# Patient Record
Sex: MALE | Race: WHITE | Employment: OTHER | URBAN - METROPOLITAN AREA
[De-identification: names, ages, dates, MRNs, and addresses within clinical notes are randomized per-mention and may not be internally consistent; named-entity substitution may affect disease eponyms.]

---

## 2018-04-10 ENCOUNTER — HOSPITAL ENCOUNTER (EMERGENCY)
Age: 58
Discharge: HOME | End: 2018-04-10
Payer: MEDICAID

## 2018-04-10 ENCOUNTER — APPOINTMENT (OUTPATIENT)
Dept: GENERAL RADIOLOGY | Age: 58
End: 2018-04-10
Payer: MEDICAID

## 2018-04-10 VITALS
OXYGEN SATURATION: 98 % | RESPIRATION RATE: 16 BRPM | SYSTOLIC BLOOD PRESSURE: 154 MMHG | BODY MASS INDEX: 32.47 KG/M2 | TEMPERATURE: 97.9 F | HEIGHT: 66 IN | HEART RATE: 60 BPM | DIASTOLIC BLOOD PRESSURE: 86 MMHG | WEIGHT: 202 LBS

## 2018-04-10 DIAGNOSIS — H81.10 BENIGN PAROXYSMAL POSITIONAL VERTIGO, UNSPECIFIED LATERALITY: Primary | ICD-10-CM

## 2018-04-10 DIAGNOSIS — R11.2 NON-INTRACTABLE VOMITING WITH NAUSEA, UNSPECIFIED VOMITING TYPE: ICD-10-CM

## 2018-04-10 DIAGNOSIS — H61.21 EXCESSIVE CERUMEN IN EAR CANAL, RIGHT: ICD-10-CM

## 2018-04-10 DIAGNOSIS — R03.0 ELEVATED BLOOD PRESSURE READING: ICD-10-CM

## 2018-04-10 LAB
ALBUMIN SERPL-MCNC: 4.1 G/DL (ref 3.5–5.2)
ALP BLD-CCNC: 75 U/L (ref 40–129)
ALT SERPL-CCNC: 40 U/L (ref 0–40)
ANION GAP SERPL CALCULATED.3IONS-SCNC: 14 MMOL/L (ref 7–16)
AST SERPL-CCNC: 30 U/L (ref 0–39)
BACTERIA: ABNORMAL /HPF
BASOPHILS ABSOLUTE: 0.07 E9/L (ref 0–0.2)
BASOPHILS RELATIVE PERCENT: 0.6 % (ref 0–2)
BILIRUB SERPL-MCNC: 0.8 MG/DL (ref 0–1.2)
BILIRUBIN URINE: NEGATIVE
BLOOD, URINE: ABNORMAL
BUN BLDV-MCNC: 12 MG/DL (ref 6–20)
CALCIUM SERPL-MCNC: 8.7 MG/DL (ref 8.6–10.2)
CHLORIDE BLD-SCNC: 102 MMOL/L (ref 98–107)
CLARITY: CLEAR
CO2: 23 MMOL/L (ref 22–29)
COLOR: YELLOW
CREAT SERPL-MCNC: 0.9 MG/DL (ref 0.7–1.2)
EKG ATRIAL RATE: 69 BPM
EKG P AXIS: 57 DEGREES
EKG P-R INTERVAL: 156 MS
EKG Q-T INTERVAL: 422 MS
EKG QRS DURATION: 98 MS
EKG QTC CALCULATION (BAZETT): 452 MS
EKG R AXIS: -16 DEGREES
EKG T AXIS: 6 DEGREES
EKG VENTRICULAR RATE: 69 BPM
EOSINOPHILS ABSOLUTE: 0.15 E9/L (ref 0.05–0.5)
EOSINOPHILS RELATIVE PERCENT: 1.4 % (ref 0–6)
GFR AFRICAN AMERICAN: >60
GFR NON-AFRICAN AMERICAN: >60 ML/MIN/1.73
GLUCOSE BLD-MCNC: 146 MG/DL (ref 74–109)
GLUCOSE URINE: NEGATIVE MG/DL
HCT VFR BLD CALC: 45.9 % (ref 37–54)
HEMOGLOBIN: 15.5 G/DL (ref 12.5–16.5)
IMMATURE GRANULOCYTES #: 0.04 E9/L
IMMATURE GRANULOCYTES %: 0.4 % (ref 0–5)
KETONES, URINE: NEGATIVE MG/DL
LEUKOCYTE ESTERASE, URINE: NEGATIVE
LYMPHOCYTES ABSOLUTE: 2.41 E9/L (ref 1.5–4)
LYMPHOCYTES RELATIVE PERCENT: 21.9 % (ref 20–42)
MCH RBC QN AUTO: 32.1 PG (ref 26–35)
MCHC RBC AUTO-ENTMCNC: 33.8 % (ref 32–34.5)
MCV RBC AUTO: 95 FL (ref 80–99.9)
MONOCYTES ABSOLUTE: 0.92 E9/L (ref 0.1–0.95)
MONOCYTES RELATIVE PERCENT: 8.3 % (ref 2–12)
NEUTROPHILS ABSOLUTE: 7.43 E9/L (ref 1.8–7.3)
NEUTROPHILS RELATIVE PERCENT: 67.4 % (ref 43–80)
NITRITE, URINE: NEGATIVE
PDW BLD-RTO: 13.2 FL (ref 11.5–15)
PH UA: 6 (ref 5–9)
PLATELET # BLD: 324 E9/L (ref 130–450)
PMV BLD AUTO: 9.7 FL (ref 7–12)
POTASSIUM SERPL-SCNC: 3.7 MMOL/L (ref 3.5–5)
PROTEIN UA: NEGATIVE MG/DL
RBC # BLD: 4.83 E12/L (ref 3.8–5.8)
RBC UA: ABNORMAL /HPF (ref 0–2)
SODIUM BLD-SCNC: 139 MMOL/L (ref 132–146)
SPECIFIC GRAVITY UA: 1.02 (ref 1–1.03)
TOTAL PROTEIN: 7.7 G/DL (ref 6.4–8.3)
TROPONIN: <0.01 NG/ML (ref 0–0.03)
UROBILINOGEN, URINE: 0.2 E.U./DL
WBC # BLD: 11 E9/L (ref 4.5–11.5)
WBC UA: ABNORMAL /HPF (ref 0–5)

## 2018-04-10 PROCEDURE — 80053 COMPREHEN METABOLIC PANEL: CPT

## 2018-04-10 PROCEDURE — 71046 X-RAY EXAM CHEST 2 VIEWS: CPT

## 2018-04-10 PROCEDURE — 85025 COMPLETE CBC W/AUTO DIFF WBC: CPT

## 2018-04-10 PROCEDURE — 81001 URINALYSIS AUTO W/SCOPE: CPT

## 2018-04-10 PROCEDURE — 99284 EMERGENCY DEPT VISIT MOD MDM: CPT

## 2018-04-10 PROCEDURE — 36415 COLL VENOUS BLD VENIPUNCTURE: CPT

## 2018-04-10 PROCEDURE — 84484 ASSAY OF TROPONIN QUANT: CPT

## 2018-04-10 PROCEDURE — 93005 ELECTROCARDIOGRAM TRACING: CPT | Performed by: PHYSICIAN ASSISTANT

## 2018-04-10 RX ORDER — MECLIZINE HYDROCHLORIDE 25 MG/1
25 TABLET ORAL 3 TIMES DAILY PRN
Qty: 30 TABLET | Refills: 0 | Status: SHIPPED | OUTPATIENT
Start: 2018-04-10 | End: 2018-04-20

## 2018-04-10 RX ORDER — ONDANSETRON 4 MG/1
4 TABLET, ORALLY DISINTEGRATING ORAL EVERY 8 HOURS PRN
Qty: 24 TABLET | Refills: 0 | Status: SHIPPED | OUTPATIENT
Start: 2018-04-10 | End: 2018-05-18

## 2018-04-30 ENCOUNTER — TELEPHONE (OUTPATIENT)
Dept: FAMILY MEDICINE CLINIC | Age: 58
End: 2018-04-30

## 2018-05-18 ENCOUNTER — TELEPHONE (OUTPATIENT)
Dept: FAMILY MEDICINE CLINIC | Age: 58
End: 2018-05-18

## 2018-05-18 ENCOUNTER — HOSPITAL ENCOUNTER (EMERGENCY)
Age: 58
Discharge: HOME | End: 2018-05-18
Payer: MEDICAID

## 2018-05-18 ENCOUNTER — APPOINTMENT (OUTPATIENT)
Dept: CT IMAGING | Age: 58
End: 2018-05-18
Payer: MEDICAID

## 2018-05-18 VITALS
SYSTOLIC BLOOD PRESSURE: 148 MMHG | WEIGHT: 210 LBS | DIASTOLIC BLOOD PRESSURE: 88 MMHG | HEART RATE: 67 BPM | BODY MASS INDEX: 33.75 KG/M2 | OXYGEN SATURATION: 98 % | RESPIRATION RATE: 16 BRPM | TEMPERATURE: 97.9 F | HEIGHT: 66 IN

## 2018-05-18 DIAGNOSIS — R10.12 LEFT UPPER QUADRANT PAIN: Primary | ICD-10-CM

## 2018-05-18 DIAGNOSIS — N20.0 KIDNEY STONE: ICD-10-CM

## 2018-05-18 LAB
ALBUMIN SERPL-MCNC: 4.2 G/DL (ref 3.5–5.2)
ALP BLD-CCNC: 69 U/L (ref 40–129)
ALT SERPL-CCNC: 36 U/L (ref 0–40)
AMYLASE: 56 U/L (ref 20–100)
ANION GAP SERPL CALCULATED.3IONS-SCNC: 12 MMOL/L (ref 7–16)
AST SERPL-CCNC: 28 U/L (ref 0–39)
BACTERIA: ABNORMAL /HPF
BASOPHILS ABSOLUTE: 0.05 E9/L (ref 0–0.2)
BASOPHILS RELATIVE PERCENT: 0.5 % (ref 0–2)
BILIRUB SERPL-MCNC: 1 MG/DL (ref 0–1.2)
BILIRUBIN URINE: NEGATIVE
BLOOD, URINE: ABNORMAL
BUN BLDV-MCNC: 13 MG/DL (ref 6–20)
CALCIUM SERPL-MCNC: 8.9 MG/DL (ref 8.6–10.2)
CHLORIDE BLD-SCNC: 102 MMOL/L (ref 98–107)
CLARITY: CLEAR
CO2: 25 MMOL/L (ref 22–29)
COLOR: YELLOW
CREAT SERPL-MCNC: 0.9 MG/DL (ref 0.7–1.2)
CRYSTALS, UA: ABNORMAL
EKG ATRIAL RATE: 62 BPM
EKG P AXIS: 33 DEGREES
EKG P-R INTERVAL: 174 MS
EKG Q-T INTERVAL: 434 MS
EKG QRS DURATION: 106 MS
EKG QTC CALCULATION (BAZETT): 440 MS
EKG R AXIS: -43 DEGREES
EKG T AXIS: 6 DEGREES
EKG VENTRICULAR RATE: 62 BPM
EOSINOPHILS ABSOLUTE: 0.16 E9/L (ref 0.05–0.5)
EOSINOPHILS RELATIVE PERCENT: 1.5 % (ref 0–6)
EPITHELIAL CELLS, UA: ABNORMAL /HPF
GFR AFRICAN AMERICAN: >60
GFR NON-AFRICAN AMERICAN: >60 ML/MIN/1.73
GLUCOSE BLD-MCNC: 93 MG/DL (ref 74–109)
GLUCOSE URINE: NEGATIVE MG/DL
HCT VFR BLD CALC: 41.6 % (ref 37–54)
HEMOGLOBIN: 14.2 G/DL (ref 12.5–16.5)
IMMATURE GRANULOCYTES #: 0.03 E9/L
IMMATURE GRANULOCYTES %: 0.3 % (ref 0–5)
KETONES, URINE: 15 MG/DL
LACTIC ACID: 0.8 MMOL/L (ref 0.5–2.2)
LEUKOCYTE ESTERASE, URINE: NEGATIVE
LIPASE: 26 U/L (ref 13–60)
LYMPHOCYTES ABSOLUTE: 2.08 E9/L (ref 1.5–4)
LYMPHOCYTES RELATIVE PERCENT: 19.8 % (ref 20–42)
MCH RBC QN AUTO: 32.1 PG (ref 26–35)
MCHC RBC AUTO-ENTMCNC: 34.1 % (ref 32–34.5)
MCV RBC AUTO: 94.1 FL (ref 80–99.9)
MONOCYTES ABSOLUTE: 0.99 E9/L (ref 0.1–0.95)
MONOCYTES RELATIVE PERCENT: 9.4 % (ref 2–12)
MUCUS: PRESENT
NEUTROPHILS ABSOLUTE: 7.17 E9/L (ref 1.8–7.3)
NEUTROPHILS RELATIVE PERCENT: 68.5 % (ref 43–80)
NITRITE, URINE: NEGATIVE
PDW BLD-RTO: 12.9 FL (ref 11.5–15)
PH UA: 6 (ref 5–9)
PLATELET # BLD: 284 E9/L (ref 130–450)
PMV BLD AUTO: 9.6 FL (ref 7–12)
POTASSIUM SERPL-SCNC: 3.6 MMOL/L (ref 3.5–5)
PROTEIN UA: NEGATIVE MG/DL
RBC # BLD: 4.42 E12/L (ref 3.8–5.8)
RBC UA: ABNORMAL /HPF (ref 0–2)
RENAL EPITHELIAL, UA: ABNORMAL /HPF
SODIUM BLD-SCNC: 139 MMOL/L (ref 132–146)
SPECIFIC GRAVITY UA: >=1.03 (ref 1–1.03)
TOTAL PROTEIN: 7.1 G/DL (ref 6.4–8.3)
UROBILINOGEN, URINE: 0.2 E.U./DL
WBC # BLD: 10.5 E9/L (ref 4.5–11.5)
WBC UA: ABNORMAL /HPF (ref 0–5)

## 2018-05-18 PROCEDURE — 81001 URINALYSIS AUTO W/SCOPE: CPT

## 2018-05-18 PROCEDURE — 80053 COMPREHEN METABOLIC PANEL: CPT

## 2018-05-18 PROCEDURE — 99284 EMERGENCY DEPT VISIT MOD MDM: CPT

## 2018-05-18 PROCEDURE — 74178 CT ABD&PLV WO CNTR FLWD CNTR: CPT

## 2018-05-18 PROCEDURE — 82150 ASSAY OF AMYLASE: CPT

## 2018-05-18 PROCEDURE — 83690 ASSAY OF LIPASE: CPT

## 2018-05-18 PROCEDURE — 85025 COMPLETE CBC W/AUTO DIFF WBC: CPT

## 2018-05-18 PROCEDURE — 93005 ELECTROCARDIOGRAM TRACING: CPT | Performed by: PHYSICIAN ASSISTANT

## 2018-05-18 PROCEDURE — 6360000004 HC RX CONTRAST MEDICATION: Performed by: RADIOLOGY

## 2018-05-18 PROCEDURE — 2580000003 HC RX 258: Performed by: PHYSICIAN ASSISTANT

## 2018-05-18 PROCEDURE — 83605 ASSAY OF LACTIC ACID: CPT

## 2018-05-18 RX ORDER — TRAMADOL HYDROCHLORIDE 50 MG/1
50 TABLET ORAL EVERY 6 HOURS PRN
Qty: 12 TABLET | Refills: 0 | Status: SHIPPED | OUTPATIENT
Start: 2018-05-18 | End: 2018-05-23

## 2018-05-18 RX ORDER — 0.9 % SODIUM CHLORIDE 0.9 %
1000 INTRAVENOUS SOLUTION INTRAVENOUS ONCE
Status: COMPLETED | OUTPATIENT
Start: 2018-05-18 | End: 2018-05-18

## 2018-05-18 RX ADMIN — IOPAMIDOL 110 ML: 755 INJECTION, SOLUTION INTRAVENOUS at 17:24

## 2018-05-18 RX ADMIN — SODIUM CHLORIDE 1000 ML: 9 INJECTION, SOLUTION INTRAVENOUS at 16:29

## 2018-05-18 ASSESSMENT — PAIN SCALES - GENERAL: PAINLEVEL_OUTOF10: 9

## 2018-05-18 ASSESSMENT — PAIN DESCRIPTION - LOCATION: LOCATION: ABDOMEN

## 2018-05-18 ASSESSMENT — PAIN DESCRIPTION - DESCRIPTORS: DESCRIPTORS: STABBING

## 2018-06-11 ENCOUNTER — TELEPHONE (OUTPATIENT)
Dept: FAMILY MEDICINE CLINIC | Age: 58
End: 2018-06-11

## 2018-06-26 ENCOUNTER — TELEPHONE (OUTPATIENT)
Dept: FAMILY MEDICINE CLINIC | Age: 58
End: 2018-06-26

## 2018-09-06 ENCOUNTER — HOSPITAL ENCOUNTER (EMERGENCY)
Age: 58
Discharge: HOME | End: 2018-09-06
Payer: MEDICAID

## 2018-09-06 VITALS
SYSTOLIC BLOOD PRESSURE: 170 MMHG | RESPIRATION RATE: 17 BRPM | DIASTOLIC BLOOD PRESSURE: 91 MMHG | HEART RATE: 66 BPM | OXYGEN SATURATION: 98 % | WEIGHT: 200 LBS | TEMPERATURE: 97.6 F | BODY MASS INDEX: 32.28 KG/M2

## 2018-09-06 DIAGNOSIS — R30.0 DYSURIA: Primary | ICD-10-CM

## 2018-09-06 LAB
BILIRUBIN URINE: NEGATIVE
BLOOD, URINE: NEGATIVE
CLARITY: CLEAR
COLOR: YELLOW
GLUCOSE URINE: NEGATIVE MG/DL
KETONES, URINE: NEGATIVE MG/DL
LEUKOCYTE ESTERASE, URINE: NEGATIVE
NITRITE, URINE: NEGATIVE
PH UA: 6 (ref 5–9)
PROTEIN UA: NEGATIVE MG/DL
SPECIFIC GRAVITY UA: 1.02 (ref 1–1.03)
UROBILINOGEN, URINE: 0.2 E.U./DL

## 2018-09-06 PROCEDURE — 99283 EMERGENCY DEPT VISIT LOW MDM: CPT

## 2018-09-06 PROCEDURE — 81003 URINALYSIS AUTO W/O SCOPE: CPT

## 2018-09-06 RX ORDER — CIPROFLOXACIN 500 MG/1
500 TABLET, FILM COATED ORAL 2 TIMES DAILY
Qty: 20 TABLET | Refills: 0 | Status: SHIPPED | OUTPATIENT
Start: 2018-09-06 | End: 2018-09-16

## 2018-09-06 NOTE — ED PROVIDER NOTES
for the plan of care and counseling regarding the diagnosis and prognosis. Questions are answered at this time and they are agreeable with the plan. Assessment      1. Dysuria      Plan   Disposition: Discharge to home  Patient condition is good    New Medications     Discharge Medication List as of 9/6/2018  1:26 PM      START taking these medications    Details   ciprofloxacin (CIPRO) 500 MG tablet Take 1 tablet by mouth 2 times daily for 10 days, Disp-20 tablet, R-0Print           Electronically signed by KATHERYN Isbell   DD: 9/6/18  **This report was transcribed using voice recognition software. Every effort was made to ensure accuracy; however, inadvertent computerized transcription errors may be present.   END OF ED PROVIDER NOTE           Naty Isbell  09/06/18 [id number]

## 2018-09-12 ENCOUNTER — OFFICE VISIT (OUTPATIENT)
Dept: PRIMARY CARE CLINIC | Age: 58
End: 2018-09-12
Payer: MEDICAID

## 2018-09-12 ENCOUNTER — HOSPITAL ENCOUNTER (OUTPATIENT)
Age: 58
Discharge: HOME | End: 2018-09-14
Payer: MEDICAID

## 2018-09-12 VITALS
HEIGHT: 65 IN | WEIGHT: 209 LBS | SYSTOLIC BLOOD PRESSURE: 128 MMHG | TEMPERATURE: 98.5 F | RESPIRATION RATE: 16 BRPM | OXYGEN SATURATION: 97 % | BODY MASS INDEX: 34.82 KG/M2 | HEART RATE: 61 BPM | DIASTOLIC BLOOD PRESSURE: 68 MMHG

## 2018-09-12 DIAGNOSIS — N41.9 PROSTATITIS, UNSPECIFIED PROSTATITIS TYPE: ICD-10-CM

## 2018-09-12 DIAGNOSIS — Z72.89 OTHER PROBLEMS RELATED TO LIFESTYLE: ICD-10-CM

## 2018-09-12 DIAGNOSIS — I10 ESSENTIAL HYPERTENSION: Primary | ICD-10-CM

## 2018-09-12 DIAGNOSIS — R35.0 BENIGN PROSTATIC HYPERPLASIA WITH URINARY FREQUENCY: ICD-10-CM

## 2018-09-12 DIAGNOSIS — A63.0 GENITAL WARTS: ICD-10-CM

## 2018-09-12 DIAGNOSIS — N40.1 BENIGN PROSTATIC HYPERPLASIA WITH URINARY FREQUENCY: ICD-10-CM

## 2018-09-12 DIAGNOSIS — A60.01 HERPES SIMPLEX INFECTION OF PENIS: ICD-10-CM

## 2018-09-12 DIAGNOSIS — H72.91 EAR DRUM PERFORATION, RIGHT: ICD-10-CM

## 2018-09-12 DIAGNOSIS — Z76.89 ESTABLISHING CARE WITH NEW DOCTOR, ENCOUNTER FOR: ICD-10-CM

## 2018-09-12 DIAGNOSIS — F41.9 ANXIETY: ICD-10-CM

## 2018-09-12 PROBLEM — N40.0 BPH (BENIGN PROSTATIC HYPERPLASIA): Status: ACTIVE | Noted: 2018-09-12

## 2018-09-12 PROBLEM — A60.00 HERPES GENITALIA: Status: ACTIVE | Noted: 2018-09-12

## 2018-09-12 LAB — PROSTATE SPECIFIC ANTIGEN: 0.57 NG/ML (ref 0–4)

## 2018-09-12 PROCEDURE — 87491 CHLMYD TRACH DNA AMP PROBE: CPT

## 2018-09-12 PROCEDURE — 84153 ASSAY OF PSA TOTAL: CPT

## 2018-09-12 PROCEDURE — 86703 HIV-1/HIV-2 1 RESULT ANTBDY: CPT

## 2018-09-12 PROCEDURE — 99214 OFFICE O/P EST MOD 30 MIN: CPT | Performed by: FAMILY MEDICINE

## 2018-09-12 PROCEDURE — 86803 HEPATITIS C AB TEST: CPT

## 2018-09-12 PROCEDURE — 87591 N.GONORRHOEAE DNA AMP PROB: CPT

## 2018-09-12 PROCEDURE — 87088 URINE BACTERIA CULTURE: CPT

## 2018-09-12 ASSESSMENT — PATIENT HEALTH QUESTIONNAIRE - PHQ9
SUM OF ALL RESPONSES TO PHQ QUESTIONS 1-9: 2
SUM OF ALL RESPONSES TO PHQ QUESTIONS 1-9: 2
SUM OF ALL RESPONSES TO PHQ9 QUESTIONS 1 & 2: 2
1. LITTLE INTEREST OR PLEASURE IN DOING THINGS: 2
2. FEELING DOWN, DEPRESSED OR HOPELESS: 0

## 2018-09-12 ASSESSMENT — ENCOUNTER SYMPTOMS
CONSTIPATION: 0
WHEEZING: 0
NAUSEA: 0
SORE THROAT: 0
DIARRHEA: 0
COUGH: 0
VOMITING: 0
BLOOD IN STOOL: 0
SHORTNESS OF BREATH: 0
ABDOMINAL PAIN: 1
EYE REDNESS: 0
PHOTOPHOBIA: 0
RHINORRHEA: 0

## 2018-09-12 NOTE — PROGRESS NOTES
2018    Chief Complaint   Patient presents with   [facility] Establish Care    Benign Prostatic Hypertrophy    Abdominal Pain     right side    Dysmenorrhea     legs and feet    Health Maintenance     colonoscopy,Tdap        HPI  Christopher Minaya (:  1960) is a 60 y.o. male, here for evaluation of the following medical concerns:    PCP prior to this was Dr. Chris Toney. Reports he was discharged from the practice for missing an appointment. Herpes genitalia: Patient reports that he contracted as when he was in his teens. Patient used to have more frequent outbreaks. However, recently the patient reports he only has outbreak maybe once a year. Patient does have numbness/tingling sensation in his groin prior to outbreaks. Ports it is outbreaks last partial in 2 weeks. Usually does not take any antivirals. Genital warts: Similar to the diagnosis of his herpes genitalia. Patient reports that he contracted this when he was in his teens. Has had only mild small warts surrounding his corona. BPPV: Patient reports several months ago he had an episode where his ear felt strange. He then had a brief episode of vertigo. Patient did use a Q-tip during this time to try to help relieve the symptoms. Patient reports that this vertigo lasted approximately a few hours and then resolved on its own. Patient does report he has some residual symptoms where he notices that it feels like there is fluid behind his ear shifting as he is upright. When he lays with his right side on the bed he reports that this seems to go away. He denies any residual vertigo. Anxiety: History of such in the past. Reports he has not had any issues with the same in the last few years. Patient has learned mind-body practices to deal with his anxiety. Hx kidney stones: Reports that he occasionally passes kidney stones. Does have at times flank pain that radiates into his groin.  Denies ever having any history of obstructing following:    Height as of this encounter: 5' 5.2\" (1.656 m). Weight as of this encounter: 209 lb (94.8 kg). Physical Exam   Constitutional: He is oriented to person, place, and time. He appears well-developed and well-nourished. HENT:   Head: Normocephalic. Right Ear: External ear normal. Tympanic membrane is perforated. Left Ear: External ear normal.   Mouth/Throat: Oropharynx is clear and moist. No oropharyngeal exudate. Eyes: Pupils are equal, round, and reactive to light. Conjunctivae and EOM are normal.   Neck: Trachea normal. Neck supple. No thyromegaly present. Cardiovascular: Normal rate, regular rhythm, normal heart sounds and intact distal pulses. No murmur heard. Pulses:       Radial pulses are 2+ on the right side, and 2+ on the left side. Posterior tibial pulses are 2+ on the right side, and 2+ on the left side. Pulmonary/Chest: Effort normal and breath sounds normal. No respiratory distress. He has no decreased breath sounds. He has no wheezes. He has no rales. Abdominal: Soft. Bowel sounds are normal. He exhibits no distension. There is no hepatosplenomegaly. There is no tenderness. There is no rebound. Genitourinary: Rectal exam shows no external hemorrhoid, no fissure, no mass, no tenderness, anal tone normal and guaiac negative stool. Prostate is enlarged. Prostate is not tender. Genitourinary Comments: General warts surrounding the corona. No signs of any active herpes lesions. Musculoskeletal: Normal range of motion. He exhibits no edema. Lymphadenopathy:     He has no cervical adenopathy. Neurological: He is alert and oriented to person, place, and time. He has normal strength. No cranial nerve deficit or sensory deficit. Reflex Scores:       Patellar reflexes are 2+ on the right side and 2+ on the left side. Achilles reflexes are 2+ on the right side and 2+ on the left side. Skin: Skin is warm and dry. No rash noted.    Psychiatric: He has a patient that he may transfer these to his partner if he does not use protection such as condoms. Other problems related to lifestyle  -     HIV Screen; Future  -     Hepatitis C Antibody; Future    Establishing care with new doctor, encounter for  Obtain records from previous providers. Health Maintenance reviewed - discussed health maintenance issues as noted above. Patient declined all those except ordered. He does not want updated on immunizations. Return in about 2 weeks (around 9/26/2018). Educational materials and/or home exercises printed for patient's review and were included in patient instructions on his/her After Visit Summary and given to patient at the end of visit.       Counseled regarding above diagnosis, including possible risks and complications,  especially if left uncontrolled.     Counseled regarding the possible side effects, risks, benefits and alternatives to treatment; patient and/or guardian verbalizes understanding, agrees, feels comfortable with and wishes to proceed with above treatment plan.     Advised patient to call with any new medication issues, and read all Rx info from pharmacy to assure aware of all possible risks and side effects of medication before taking.     Reviewed age and gender appropriate health screening exams and vaccinations. Advised patient regarding importance of keeping up with recommended health maintenance and to schedule as soon as possible if overdue, as this is important in assessing for undiagnosed pathology, especially cancer, as well as protecting against potentially harmful/life threatening disease.       Patient and/or guardian verbalizes understanding and agrees with above counseling, assessment and plan.     All questions answered. An  electronic signature was used to authenticate this note.     --Muna Menjivar MD on 9/12/18 at 10:32 AM

## 2018-09-13 ENCOUNTER — TELEPHONE (OUTPATIENT)
Dept: ENT CLINIC | Age: 58
End: 2018-09-13

## 2018-09-13 LAB
HEPATITIS C ANTIBODY INTERPRETATION: NORMAL
HIV-1 AND HIV-2 ANTIBODIES: NORMAL

## 2018-09-14 LAB — URINE CULTURE, ROUTINE: NORMAL

## 2018-09-18 LAB
CHLAMYDIA TRACHOMATIS AMPLIFIED DET: NORMAL
N GONORRHOEAE AMPLIFIED DET: NORMAL

## 2018-09-21 ENCOUNTER — TELEPHONE (OUTPATIENT)
Dept: PRIMARY CARE CLINIC | Age: 58
End: 2018-09-21

## 2018-09-21 DIAGNOSIS — R35.0 BENIGN PROSTATIC HYPERPLASIA WITH URINARY FREQUENCY: Primary | ICD-10-CM

## 2018-09-21 DIAGNOSIS — N40.1 BENIGN PROSTATIC HYPERPLASIA WITH URINARY FREQUENCY: Primary | ICD-10-CM

## 2018-09-21 DIAGNOSIS — N41.1 CHRONIC PROSTATITIS: ICD-10-CM

## 2018-09-21 NOTE — TELEPHONE ENCOUNTER
Please review lab results on this patient. Also I moved his appointment up from 10/1 to 9/24. He has many questions regarding symptoms he feels are attributed to an MRI with contrast he had 2 months ago. He feels he may be allergic to dye. He mentioned he was to have a referral to a proctologist but I can't fined any mention in encounter. Please advise.   Thank you

## 2018-09-24 ENCOUNTER — OFFICE VISIT (OUTPATIENT)
Dept: PRIMARY CARE CLINIC | Age: 58
End: 2018-09-24
Payer: MEDICAID

## 2018-09-24 VITALS
RESPIRATION RATE: 20 BRPM | WEIGHT: 212 LBS | BODY MASS INDEX: 34.07 KG/M2 | HEART RATE: 67 BPM | OXYGEN SATURATION: 97 % | HEIGHT: 66 IN | SYSTOLIC BLOOD PRESSURE: 128 MMHG | TEMPERATURE: 98.1 F | DIASTOLIC BLOOD PRESSURE: 68 MMHG

## 2018-09-24 DIAGNOSIS — R07.89 CHEST PAIN, ATYPICAL: ICD-10-CM

## 2018-09-24 DIAGNOSIS — N41.1 CHRONIC PROSTATITIS: ICD-10-CM

## 2018-09-24 DIAGNOSIS — H72.91 EAR DRUM PERFORATION, RIGHT: Primary | ICD-10-CM

## 2018-09-24 PROBLEM — Z76.89 ESTABLISHING CARE WITH NEW DOCTOR, ENCOUNTER FOR: Status: RESOLVED | Noted: 2018-09-12 | Resolved: 2018-09-24

## 2018-09-24 PROCEDURE — 93000 ELECTROCARDIOGRAM COMPLETE: CPT | Performed by: FAMILY MEDICINE

## 2018-09-24 PROCEDURE — 99214 OFFICE O/P EST MOD 30 MIN: CPT | Performed by: FAMILY MEDICINE

## 2018-09-24 RX ORDER — CIPROFLOXACIN 500 MG/1
500 TABLET, FILM COATED ORAL 2 TIMES DAILY
COMMUNITY
End: 2021-01-04

## 2018-09-24 ASSESSMENT — ENCOUNTER SYMPTOMS
CONSTIPATION: 0
NAUSEA: 0
VOMITING: 0
SORE THROAT: 0
ABDOMINAL PAIN: 0
WHEEZING: 0
DIARRHEA: 0
SHORTNESS OF BREATH: 0
RHINORRHEA: 0

## 2018-09-24 NOTE — PATIENT INSTRUCTIONS
Patient Education        Chest Pain: Care Instructions  Your Care Instructions    There are many things that can cause chest pain. Some are not serious and will get better on their own in a few days. But some kinds of chest pain need more testing and treatment. Your doctor may have recommended a follow-up visit in the next 8 to 12 hours. If you are not getting better, you may need more tests or treatment. Even though your doctor has released you, you still need to watch for any problems. The doctor carefully checked you, but sometimes problems can develop later. If you have new symptoms or if your symptoms do not get better, get medical care right away. If you have worse or different chest pain or pressure that lasts more than 5 minutes or you passed out (lost consciousness), call 911 or seek other emergency help right away. A medical visit is only one step in your treatment. Even if you feel better, you still need to do what your doctor recommends, such as going to all suggested follow-up appointments and taking medicines exactly as directed. This will help you recover and help prevent future problems. How can you care for yourself at home? · Rest until you feel better. · Take your medicine exactly as prescribed. Call your doctor if you think you are having a problem with your medicine. · Do not drive after taking a prescription pain medicine. When should you call for help? Call 911 if:    · You passed out (lost consciousness).     · You have severe difficulty breathing.     · You have symptoms of a heart attack. These may include:  ¨ Chest pain or pressure, or a strange feeling in your chest.  ¨ Sweating. ¨ Shortness of breath. ¨ Nausea or vomiting. ¨ Pain, pressure, or a strange feeling in your back, neck, jaw, or upper belly or in one or both shoulders or arms. ¨ Lightheadedness or sudden weakness. ¨ A fast or irregular heartbeat.   After you call 911, the  may tell you to chew 1 (Tylenol), which does not cause stomach upset. When should you call for help? Call your doctor now or seek immediate medical care if:    · You have new or worse belly pain.     · You are vomiting.    Watch closely for changes in your health, and be sure to contact your doctor if:    · You have new or worse symptoms of indigestion.     · You have trouble or pain swallowing.     · You are losing weight.     · You do not get better as expected. Where can you learn more? Go to https://VEEDIMS.[employer]. org and sign in to your [employer] account. Enter Z193 in the [facility] box to learn more about \"Indigestion (Dyspepsia or Heartburn): Care Instructions. \"     If you do not have an account, please click on the \"Sign Up Now\" link. Current as of: May 12, 2017  Content Version: 11.7  © 7741-1316 [employer]. Care instructions adapted under license by [facility] ([facility]). If you have questions about a medical condition or this instruction, always ask your healthcare professional. [address] any warranty or liability for your use of this information.

## 2018-09-24 NOTE — PROGRESS NOTES
sounds are normal. There is no tenderness. Neurological: He is alert. Cranial nerves grossly intact   Psychiatric: He has a normal mood and affect. ASSESSMENT/PLAN:  Alma Ortega was seen today for 2 week follow-up. Diagnoses and all orders for this visit:    Ear drum perforation, right  Patient to follow-up with ENT in approximately 2 weeks. Chest pain, atypical  -     EKG 12 Lead  -     [address] [facility]- Devonte Steve MD  Patient notified to keep a food journal and symptom journal. Patient will be provided a handout in regards to helping resolve his GERD symptoms. EKG today per my interpretation shows no acute ST segment changes. Patient is to have a stress test by cardiology. Referral as above. If the patient has more persistent, worsening symptoms he is to proceed immediately to the emergency department or call 911. Chronic prostatitis  Believe his current symptoms are secondary to possibly a internal herpes outbreak. Labs including a urinalysis along with GC and chlamydia were normal. PSA was also normal. patient has been referred to urology in regards to this issue. Return in about 2 months (around 11/24/2018) for Follow-up Appointment From Today's Visit. An electronic signature was used to authenticate this note.     --Jamil Torres MD on 9/24/18 at 1:36 PM

## 2018-09-25 ENCOUNTER — TELEPHONE (OUTPATIENT)
Dept: PRIMARY CARE CLINIC | Age: 58
End: 2018-09-25

## 2018-09-26 DIAGNOSIS — R07.89 CHEST PAIN, ATYPICAL: Primary | ICD-10-CM

## 2019-03-25 ENCOUNTER — HOSPITAL ENCOUNTER (OUTPATIENT)
Age: 59
Discharge: HOME | End: 2019-03-27
Payer: MEDICAID

## 2019-03-25 LAB — PROSTATE SPECIFIC ANTIGEN: 0.55 NG/ML (ref 0–4)

## 2019-03-25 PROCEDURE — G0103 PSA SCREENING: HCPCS

## 2019-03-26 ENCOUNTER — TELEPHONE (OUTPATIENT)
Dept: CARDIOLOGY | Age: 59
End: 2019-03-26

## 2020-08-24 ENCOUNTER — HOSPITAL ENCOUNTER (EMERGENCY)
Age: 60
Discharge: HOME | End: 2020-08-24
Payer: MEDICAID

## 2020-08-24 ENCOUNTER — APPOINTMENT (OUTPATIENT)
Dept: GENERAL RADIOLOGY | Age: 60
End: 2020-08-24
Payer: MEDICAID

## 2020-08-24 VITALS
DIASTOLIC BLOOD PRESSURE: 74 MMHG | TEMPERATURE: 98 F | OXYGEN SATURATION: 99 % | WEIGHT: 210 LBS | SYSTOLIC BLOOD PRESSURE: 184 MMHG | HEIGHT: 66 IN | BODY MASS INDEX: 33.75 KG/M2 | RESPIRATION RATE: 18 BRPM | HEART RATE: 65 BPM

## 2020-08-24 PROCEDURE — 99284 EMERGENCY DEPT VISIT MOD MDM: CPT

## 2020-08-24 PROCEDURE — 99283 EMERGENCY DEPT VISIT LOW MDM: CPT

## 2020-08-24 PROCEDURE — 12002 RPR S/N/AX/GEN/TRNK2.6-7.5CM: CPT

## 2020-08-24 PROCEDURE — 73130 X-RAY EXAM OF HAND: CPT

## 2020-08-24 PROCEDURE — 6360000002 HC RX W HCPCS: Performed by: PHYSICIAN ASSISTANT

## 2020-08-24 PROCEDURE — 2500000003 HC RX 250 WO HCPCS: Performed by: PHYSICIAN ASSISTANT

## 2020-08-24 PROCEDURE — 90715 TDAP VACCINE 7 YRS/> IM: CPT | Performed by: PHYSICIAN ASSISTANT

## 2020-08-24 PROCEDURE — 90471 IMMUNIZATION ADMIN: CPT | Performed by: PHYSICIAN ASSISTANT

## 2020-08-24 PROCEDURE — 6370000000 HC RX 637 (ALT 250 FOR IP): Performed by: PHYSICIAN ASSISTANT

## 2020-08-24 RX ORDER — LIDOCAINE HYDROCHLORIDE 10 MG/ML
5 INJECTION, SOLUTION EPIDURAL; INFILTRATION; INTRACAUDAL; PERINEURAL ONCE
Status: COMPLETED | OUTPATIENT
Start: 2020-08-24 | End: 2020-08-24

## 2020-08-24 RX ORDER — ACETAMINOPHEN 650 MG
TABLET, EXTENDED RELEASE ORAL ONCE
Status: COMPLETED | OUTPATIENT
Start: 2020-08-24 | End: 2020-08-24

## 2020-08-24 RX ORDER — CEPHALEXIN 500 MG/1
500 CAPSULE ORAL ONCE
Status: COMPLETED | OUTPATIENT
Start: 2020-08-24 | End: 2020-08-24

## 2020-08-24 RX ORDER — BACITRACIN ZINC AND POLYMYXIN B SULFATE 500; 1000 [USP'U]/G; [USP'U]/G
OINTMENT TOPICAL
Qty: 1 TUBE | Refills: 1 | Status: SHIPPED | OUTPATIENT
Start: 2020-08-24 | End: 2020-08-31

## 2020-08-24 RX ORDER — CEPHALEXIN 500 MG/1
500 CAPSULE ORAL 4 TIMES DAILY
Qty: 40 CAPSULE | Refills: 0 | Status: SHIPPED | OUTPATIENT
Start: 2020-08-24 | End: 2020-09-03

## 2020-08-24 RX ADMIN — LIDOCAINE HYDROCHLORIDE 5 ML: 10 INJECTION, SOLUTION EPIDURAL; INFILTRATION; INTRACAUDAL; PERINEURAL at 13:08

## 2020-08-24 RX ADMIN — TETANUS TOXOID, REDUCED DIPHTHERIA TOXOID AND ACELLULAR PERTUSSIS VACCINE, ADSORBED 0.5 ML: 5; 2.5; 8; 8; 2.5 SUSPENSION INTRAMUSCULAR at 13:08

## 2020-08-24 RX ADMIN — CEPHALEXIN 500 MG: 500 CAPSULE ORAL at 13:07

## 2020-08-24 RX ADMIN — Medication: at 13:13

## 2020-08-24 ASSESSMENT — PAIN DESCRIPTION - PAIN TYPE: TYPE: ACUTE PAIN

## 2020-08-24 ASSESSMENT — PAIN DESCRIPTION - LOCATION: LOCATION: WRIST

## 2020-08-24 ASSESSMENT — PAIN SCALES - GENERAL: PAINLEVEL_OUTOF10: 5

## 2020-08-24 ASSESSMENT — PAIN DESCRIPTION - ORIENTATION: ORIENTATION: RIGHT

## 2020-08-24 NOTE — ED PROVIDER NOTES
[facility]     Department of Emergency Medicine   ED  Provider Note  Admit Date/RoomTime: 8/24/2020 12:58 PM  ED Room: [address] [place] [address]  Chief Complaint:   Laceration (cut wrist with razor blade)    History of Present Illness   Source of history provided by:  patient. History/Exam Limitations: none. Shamika Pettit is a 63 y.o. old male presenting to the emergency department by private vehicle, for a laceration to the left wrist, caused by knife, dirty, which occurred at home approximately 1 hour(s) prior to arrival.  There is not a possibility of retained foreign body in the affected area. The patients tetanus status is more than 10 years ago. Bleeding is not controlled. There is pain at injury site. Patient denies any other injuries    ROS    Pertinent positives and negatives are stated within HPI, all other systems reviewed and are negative. Past Medical History:  has a past medical history of Anxiety, Herpes, Hypertension, and Nephrolithiasis. Past Surgical History:  has a past surgical history that includes Finger surgery (Left, 25). Social History:  reports that he quit smoking about 30 years ago. His smoking use included cigarettes. He started smoking about 45 years ago. He has a 7.50 pack-year smoking history. He has never used smokeless tobacco. He reports current alcohol use of about 2.0 standard drinks of alcohol per week. He reports that he does not use drugs. Family History: family history includes Alcohol Abuse in his son; Cancer (age of onset: 79) in his mother; Depression in his sister; No Known Problems in his brother, sister, sister, sister, and sister; Substance Abuse in his brother and son.    Allergies: Codeine    Physical Exam           ED Triage Vitals   BP Temp Temp Source Pulse Resp SpO2 Height Weight   08/24/20 1255 08/24/20 1231 08/24/20 1255 08/24/20 1231 08/24/20 1255 08/24/20 1231 08/24/20 1255 08/24/20 1255   (!) 184/74 97.5 °F (36.4 °C) Temporal 67 18 94 % 5' 6\" (1.676 m) 210 lb (95.3 kg)      Oxygen Saturation Interpretation: Normal.    Constitutional:  Alert, development consistent with age. Neck:  Normal ROM. Supple. Non-tender. Hand: Left dorsal & volar all proximal, mid and distal aspect  Proximal Phalanx, Middle Phalanx, Distal Phalanx and Metacarpal .              Tenderness: none. Swelling: None. Deformity: no.             Skin:  no erythema, rash or wounds noted. Neurovascular: Motor deficit: none. Sensory deficit: none. Pulse deficit: none. Capillary refill: normal.  Fingers:  all            Tenderness:  none. Swelling: None. Deformity: no.              ROM: full range of motion. Skin:  no erythema, rash or wounds noted. Wrist:               Tenderness:  moderate. Swelling: Mild. Deformity: no.             ROM: full range with pain. Skin: 5 cm laceration to the patient's wrist caused by dirty knife. Patient has full flexion, extension, abduction, abduction equal  strength, good radial pulse and has good opposition. No evidence of a tendon injury. Patient neurovascular and sensory intact and has good cap refill  Lymphatics: No lymphangitis or adenopathy noted. Neurological:  Oriented. Motor functions intact. Lab / Imaging Results   (All laboratory and radiology results have been personally reviewed by myself)  Labs:  No results found for this visit on 08/24/20. Imaging: All Radiology results interpreted by Radiologist unless otherwise noted.   XR HAND LEFT (MIN 3 VIEWS)   Final Result   Moderate degenerative changes        ED Course / Medical Decision Making     Medications   Tetanus-Diphth-Acell Pertussis (BOOSTRIX) injection 0.5 mL (0.5 mLs Intramuscular Given 8/24/20 1308)   povidone-iodine (BETADINE) 10 % external solution ( Topical Given 8/24/20 1313)   lidocaine PF 1 % injection 5 mL (5 mLs Intradermal Given 8/24/20 1308)   cephALEXin (KEFLEX) capsule 500 mg (500 mg Oral Given 8/24/20 1307)        Re-examination:  8/24/20       Time: Patient had a laceration repaired is comfortably waiting in his chair awaiting x-ray. Consult(s):   None    Procedure(s):   Please see [facility] nurse practitioner procedure note    MDM:   Patient is a 65-year-old male that presented to the emergency department for a accidental left wrist laceration due to a dirty knife. Patient was thoroughly examined and has full range of motion no evidence of any form of a tendon injury. Patient is neurovascular and sensory intact and has good cap refill. Area was thoroughly cleaned and he was soaked in Betadine and sterile saline. Tetanus was updated. Laceration repair was completed by Buck Jones nurse practitioner. Patient was advised of worsening signs of infection such as redness, swelling, pus, lymphatic streaking, fever, chills or swelling of the hand or wrist to return to the emergency department immediately. Patient was put on Keflex 4 times a day for the next 10 days per again against cellulitis. Patient was also sent home with bacitracin and told to keep the dressing on daily and then uncovered at night. Patient will follow-up with his PCP within 7 to 10 days to have the sutures removed. Patient voiced understanding and agree with the plan of management. Patient was explicitly instructed on specific signs and symptoms on which to return to the emergency room for. Patient was instructed to return to the ER for any new or worsening symptoms. Additional discharge instructions were given verbally. All questions were answered. Patient is comfortable and agreeable with discharge plan. Patient in no acute distress and non-toxic in appearance. Counseling:     The emergency provider has spoken with the patient and discussed todays results, in addition to providing specific details for the plan of care and counseling regarding the diagnosis and prognosis. Questions are answered at this time and they are agreeable with the plan. Assessment      1. Laceration of left hand without foreign body, initial encounter    2. Need for tetanus booster      Plan   Discharge to home  Patient condition is stable    New Medications     Discharge Medication List as of 8/24/2020  2:53 PM      START taking these medications    Details   cephALEXin (KEFLEX) 500 MG capsule Take 1 capsule by mouth 4 times daily for 10 days, Disp-40 capsule,R-0Print      bacitracin-polymyxin b (POLYSPORIN) [id number] UNIT/GM ointment Apply topically 2 times daily. , Disp-1 Tube,R-1, Print           Electronically signed by Harrison Mathew PA-C   DD: 8/24/20  **This report was transcribed using voice recognition software. Every effort was made to ensure accuracy; however, inadvertent computerized transcription errors may be present.   END OF ED PROVIDER NOTE      Harrison Mathew PA-C  08/24/20 2835

## 2021-01-04 ENCOUNTER — OFFICE VISIT (OUTPATIENT)
Dept: PRIMARY CARE CLINIC | Age: 61
End: 2021-01-04
Payer: MEDICAID

## 2021-01-04 VITALS
SYSTOLIC BLOOD PRESSURE: 164 MMHG | DIASTOLIC BLOOD PRESSURE: 86 MMHG | WEIGHT: 215 LBS | BODY MASS INDEX: 34.55 KG/M2 | TEMPERATURE: 97.1 F | HEART RATE: 70 BPM | HEIGHT: 66 IN | OXYGEN SATURATION: 97 % | RESPIRATION RATE: 20 BRPM

## 2021-01-04 DIAGNOSIS — Z13.220 LIPID SCREENING: ICD-10-CM

## 2021-01-04 DIAGNOSIS — I10 ESSENTIAL HYPERTENSION: Primary | ICD-10-CM

## 2021-01-04 DIAGNOSIS — I10 ESSENTIAL HYPERTENSION: ICD-10-CM

## 2021-01-04 DIAGNOSIS — M50.30 DDD (DEGENERATIVE DISC DISEASE), CERVICAL: ICD-10-CM

## 2021-01-04 DIAGNOSIS — G62.9 NEUROPATHY: ICD-10-CM

## 2021-01-04 DIAGNOSIS — D17.0 LIPOMA OF NECK: Primary | ICD-10-CM

## 2021-01-04 DIAGNOSIS — M54.2 NECK PAIN: Primary | ICD-10-CM

## 2021-01-04 DIAGNOSIS — M54.2 NECK PAIN: ICD-10-CM

## 2021-01-04 DIAGNOSIS — D17.0 LIPOMA OF NECK: ICD-10-CM

## 2021-01-04 LAB
ALBUMIN SERPL-MCNC: 4.2 G/DL (ref 3.5–5.2)
ALP BLD-CCNC: 75 U/L (ref 40–129)
ALT SERPL-CCNC: 27 U/L (ref 0–40)
ANION GAP SERPL CALCULATED.3IONS-SCNC: 13 MMOL/L (ref 7–16)
AST SERPL-CCNC: 21 U/L (ref 0–39)
BASOPHILS ABSOLUTE: 0.06 E9/L (ref 0–0.2)
BASOPHILS RELATIVE PERCENT: 0.7 % (ref 0–2)
BILIRUB SERPL-MCNC: 0.6 MG/DL (ref 0–1.2)
BUN BLDV-MCNC: 13 MG/DL (ref 8–23)
CALCIUM SERPL-MCNC: 9.5 MG/DL (ref 8.6–10.2)
CHLORIDE BLD-SCNC: 103 MMOL/L (ref 98–107)
CHOLESTEROL, TOTAL: 188 MG/DL (ref 0–199)
CO2: 25 MMOL/L (ref 22–29)
CREAT SERPL-MCNC: 0.9 MG/DL (ref 0.7–1.2)
EOSINOPHILS ABSOLUTE: 0.18 E9/L (ref 0.05–0.5)
EOSINOPHILS RELATIVE PERCENT: 2 % (ref 0–6)
FOLATE: 15.8 NG/ML (ref 4.8–24.2)
GFR AFRICAN AMERICAN: >60
GFR NON-AFRICAN AMERICAN: >60 ML/MIN/1.73
GLUCOSE BLD-MCNC: 116 MG/DL (ref 74–99)
HCT VFR BLD CALC: 46.6 % (ref 37–54)
HDLC SERPL-MCNC: 41 MG/DL
HEMOGLOBIN: 15.5 G/DL (ref 12.5–16.5)
IMMATURE GRANULOCYTES #: 0.04 E9/L
IMMATURE GRANULOCYTES %: 0.5 % (ref 0–5)
LDL CHOLESTEROL CALCULATED: 95 MG/DL (ref 0–99)
LYMPHOCYTES ABSOLUTE: 2.63 E9/L (ref 1.5–4)
LYMPHOCYTES RELATIVE PERCENT: 29.9 % (ref 20–42)
MCH RBC QN AUTO: 32 PG (ref 26–35)
MCHC RBC AUTO-ENTMCNC: 33.3 % (ref 32–34.5)
MCV RBC AUTO: 96.3 FL (ref 80–99.9)
MONOCYTES ABSOLUTE: 0.84 E9/L (ref 0.1–0.95)
MONOCYTES RELATIVE PERCENT: 9.5 % (ref 2–12)
NEUTROPHILS ABSOLUTE: 5.06 E9/L (ref 1.8–7.3)
NEUTROPHILS RELATIVE PERCENT: 57.4 % (ref 43–80)
PDW BLD-RTO: 12.9 FL (ref 11.5–15)
PLATELET # BLD: 309 E9/L (ref 130–450)
PMV BLD AUTO: 10 FL (ref 7–12)
POTASSIUM SERPL-SCNC: 4.1 MMOL/L (ref 3.5–5)
RBC # BLD: 4.84 E12/L (ref 3.8–5.8)
SODIUM BLD-SCNC: 141 MMOL/L (ref 132–146)
TOTAL PROTEIN: 7.9 G/DL (ref 6.4–8.3)
TRIGL SERPL-MCNC: 262 MG/DL (ref 0–149)
TSH SERPL DL<=0.05 MIU/L-ACNC: 1.65 UIU/ML (ref 0.27–4.2)
VITAMIN B-12: 396 PG/ML (ref 211–946)
VLDLC SERPL CALC-MCNC: 52 MG/DL
WBC # BLD: 8.8 E9/L (ref 4.5–11.5)

## 2021-01-04 PROCEDURE — G8427 DOCREV CUR MEDS BY ELIG CLIN: HCPCS | Performed by: FAMILY MEDICINE

## 2021-01-04 PROCEDURE — 99214 OFFICE O/P EST MOD 30 MIN: CPT | Performed by: FAMILY MEDICINE

## 2021-01-04 PROCEDURE — 1036F TOBACCO NON-USER: CPT | Performed by: FAMILY MEDICINE

## 2021-01-04 PROCEDURE — 3017F COLORECTAL CA SCREEN DOC REV: CPT | Performed by: FAMILY MEDICINE

## 2021-01-04 PROCEDURE — G8417 CALC BMI ABV UP PARAM F/U: HCPCS | Performed by: FAMILY MEDICINE

## 2021-01-04 PROCEDURE — G8484 FLU IMMUNIZE NO ADMIN: HCPCS | Performed by: FAMILY MEDICINE

## 2021-01-04 ASSESSMENT — PATIENT HEALTH QUESTIONNAIRE - PHQ9
SUM OF ALL RESPONSES TO PHQ QUESTIONS 1-9: 0
SUM OF ALL RESPONSES TO PHQ QUESTIONS 1-9: 0
2. FEELING DOWN, DEPRESSED OR HOPELESS: 0
SUM OF ALL RESPONSES TO PHQ9 QUESTIONS 1 & 2: 0
1. LITTLE INTEREST OR PLEASURE IN DOING THINGS: 0

## 2021-01-04 ASSESSMENT — ENCOUNTER SYMPTOMS
SORE THROAT: 0
CONSTIPATION: 0
SHORTNESS OF BREATH: 0
RHINORRHEA: 0
ABDOMINAL PAIN: 0
DIARRHEA: 0
WHEEZING: 0
VOMITING: 0
NAUSEA: 0

## 2021-01-04 NOTE — PROGRESS NOTES
2021     Chief Complaint   Patient presents with    Arm Pain     right, numbness and tingling down arm, laying on it and raising above head    Mass     behind right ear, sometimes causing swelling and headache    Foot Burn     attributes to diet changes. BRADLEY Meehan (:  1960) is a 63 y.o. male, here for evaluation of the following medical concerns:    Reports several years ago reports he injured his neck while working. Has injured his right side of his neck multiple times since this time. Reports recently re-injured his right side of his neck, shoulder. Reports  Radiculopathy of the right arm. Reports he also has noticed a mass along the right post aspect of his neck. Reports pain along this mass. Reports numbness of his whole hand. Reports recent infection of his right forearm that has now resolved. Has also had issues with his feet burning all along the bottom of his foot. L>R. Started a few months ago. BP today is elevated. Hx of kidney stones. None recently. Reports hx of carpal tunnel. Used to smoke. Review of Systems   Constitutional: Negative for chills and fever. HENT: Negative for congestion, rhinorrhea and sore throat. Respiratory: Negative for shortness of breath and wheezing. Cardiovascular: Negative for chest pain and leg swelling. Gastrointestinal: Negative for abdominal pain, constipation, diarrhea, nausea and vomiting. Genitourinary: Negative for dysuria. Musculoskeletal: Positive for arthralgias and neck pain. Skin: Negative for rash. Neurological: Positive for numbness. Negative for light-headedness and headaches. Psychiatric/Behavioral: The patient is nervous/anxious.         Past Medical History:   Diagnosis Date    Anxiety     Herpes     Hypertension     Nephrolithiasis        Prior to Visit Medications    Not on File        Allergies   Allergen Reactions    Codeine Other (See Comments)     irritability       Social History Tobacco Use    Smoking status: Former Smoker     Packs/day: 0.50     Years: 15.00     Pack years: 7.50     Types: Cigarettes     Start date: 1975     Quit date: 1990     Years since quittin.0    Smokeless tobacco: Never Used   Substance Use Topics    Alcohol use: Yes     Alcohol/week: 2.0 standard drinks     Types: 2 Cans of beer per week     Comment: occasional           Vitals:    21 0859 21 0911   BP: (!) 166/92 (!) 164/86   Pulse: 70    Resp: 20    Temp: 97.1 °F (36.2 °C)    TempSrc: Temporal    SpO2: 97%    Weight: 215 lb (97.5 kg)    Height: 5' 6\" (1.676 m)      Estimated body mass index is 34.7 kg/m² as calculated from the following:    Height as of this encounter: 5' 6\" (1.676 m). Weight as of this encounter: 215 lb (97.5 kg). Physical Exam  Constitutional:       Appearance: He is well-developed. HENT:      Head: Normocephalic. Eyes:      Extraocular Movements: Extraocular movements intact. Conjunctiva/sclera: Conjunctivae normal.   Neck:        Comments: Right posterior fluctuant mass ~4cm. Non-mobile. Cardiovascular:      Rate and Rhythm: Normal rate and regular rhythm. Pulses:           Posterior tibial pulses are 2+ on the right side and 2+ on the left side. Heart sounds: Normal heart sounds. No murmur. Pulmonary:      Effort: Pulmonary effort is normal.      Breath sounds: Normal breath sounds. No wheezing or rales. Abdominal:      General: Bowel sounds are normal.      Palpations: Abdomen is soft. Tenderness: There is no abdominal tenderness. Musculoskeletal:      Right lower leg: No edema. Left lower leg: No edema. Feet:      Right foot:      Skin integrity: Callus and dry skin present. No erythema or warmth. Toenail Condition: Fungal disease present. Left foot:      Skin integrity: Callus and dry skin present. No erythema or warmth. Toenail Condition: Fungal disease present.   Neurological:

## 2021-01-18 ENCOUNTER — OFFICE VISIT (OUTPATIENT)
Dept: PRIMARY CARE CLINIC | Age: 61
End: 2021-01-18
Payer: MEDICAID

## 2021-01-18 VITALS
OXYGEN SATURATION: 97 % | RESPIRATION RATE: 22 BRPM | HEIGHT: 66 IN | HEART RATE: 59 BPM | WEIGHT: 215 LBS | DIASTOLIC BLOOD PRESSURE: 76 MMHG | BODY MASS INDEX: 34.55 KG/M2 | SYSTOLIC BLOOD PRESSURE: 168 MMHG | TEMPERATURE: 97.6 F

## 2021-01-18 DIAGNOSIS — G62.9 NEUROPATHY: ICD-10-CM

## 2021-01-18 DIAGNOSIS — D17.0 LIPOMA OF NECK: ICD-10-CM

## 2021-01-18 DIAGNOSIS — I10 ESSENTIAL HYPERTENSION: Primary | ICD-10-CM

## 2021-01-18 DIAGNOSIS — M50.30 DDD (DEGENERATIVE DISC DISEASE), CERVICAL: ICD-10-CM

## 2021-01-18 PROCEDURE — 3017F COLORECTAL CA SCREEN DOC REV: CPT | Performed by: FAMILY MEDICINE

## 2021-01-18 PROCEDURE — G8417 CALC BMI ABV UP PARAM F/U: HCPCS | Performed by: FAMILY MEDICINE

## 2021-01-18 PROCEDURE — 99213 OFFICE O/P EST LOW 20 MIN: CPT | Performed by: FAMILY MEDICINE

## 2021-01-18 PROCEDURE — G8484 FLU IMMUNIZE NO ADMIN: HCPCS | Performed by: FAMILY MEDICINE

## 2021-01-18 PROCEDURE — 1036F TOBACCO NON-USER: CPT | Performed by: FAMILY MEDICINE

## 2021-01-18 PROCEDURE — G8427 DOCREV CUR MEDS BY ELIG CLIN: HCPCS | Performed by: FAMILY MEDICINE

## 2021-01-18 PROCEDURE — 93000 ELECTROCARDIOGRAM COMPLETE: CPT | Performed by: FAMILY MEDICINE

## 2021-01-18 ASSESSMENT — ENCOUNTER SYMPTOMS
ABDOMINAL PAIN: 0
NAUSEA: 0
RHINORRHEA: 0
WHEEZING: 1
VOMITING: 0
SHORTNESS OF BREATH: 0
SORE THROAT: 0
DIARRHEA: 0
CONSTIPATION: 0

## 2021-01-18 NOTE — PROGRESS NOTES
2021     Chief Complaint   Patient presents with    Hypertension     follow up       HPI  Kacy Hoffman (:  1960) is a 63 y.o. male, here for evaluation of the following medical concerns:    Patient is a 62 y/o M w/ hx of atypical CP, prostatitis, right ear drum perforation, and concerns today for HTN/DDD/Foot burning/Right Ear Mass. Labs: B12 and folate within normal limits, CBC within normal limits, CMP within normal limits, total cholesterol 188, triglycerides 262, HDL 41, LDL 95, TSH 1.6.    US     Impression   Nonspecific 3 cm lesion in the right postauricular area.  Finding may   represent lipoma or scarring from prior injury.  Further evaluation may be   obtained with biopsy if clinically warranted.         XR     Impression   1. There is no acute abnormality of the cervical spine   2. Advanced multilevel facet arthropathy. 3. Degenerative disc disease at the C6-7 level.         DDD: Improving neck and arm pain. Still radicular like symptoms when reaching over his head for an extended period of time. Hypertension: Patient's blood pressure is elevated today. Patient is asymptomatic. Patient brings in a blood pressure log that shows his blood pressure is below his goal of 150/90 consistently. Patient does report some anxiety with today's office appointment. Obesity: Patient reports he has modified his diet and plans on exercising more in the near future to avoid starting any blood pressure medications or cholesterol medications. Review of Systems   Constitutional: Negative for chills and fever. HENT: Negative for congestion, rhinorrhea and sore throat. Respiratory: Positive for wheezing. Negative for shortness of breath. Cardiovascular: Negative for chest pain and leg swelling. Gastrointestinal: Negative for abdominal pain, constipation, diarrhea, nausea and vomiting. Musculoskeletal: Positive for arthralgias and neck pain. Skin: Negative for rash. Neurological: Negative for light-headedness and headaches. Past Medical History:   Diagnosis Date    Anxiety     Herpes     Hypertension     Nephrolithiasis        Prior to Visit Medications    Not on File        Allergies   Allergen Reactions    Codeine Other (See Comments)     irritability       Social History     Tobacco Use    Smoking status: Former Smoker     Packs/day: 0.50     Years: 15.00     Pack years: 7.50     Types: Cigarettes     Start date: 1975     Quit date: 1990     Years since quittin.0    Smokeless tobacco: Never Used   Substance Use Topics    Alcohol use: Yes     Alcohol/week: 2.0 standard drinks     Types: 2 Cans of beer per week     Comment: occasional           Vitals:    21 1006 21 1010   BP: (!) 170/84 (!) 168/76   Pulse: 59    Resp: 22    Temp: 97.6 °F (36.4 °C)    TempSrc: Temporal    SpO2: 97%    Weight: 215 lb (97.5 kg)    Height: 5' 6\" (1.676 m)      Estimated body mass index is 34.7 kg/m² as calculated from the following:    Height as of this encounter: 5' 6\" (1.676 m). Weight as of this encounter: 215 lb (97.5 kg). Physical Exam  Constitutional:       Appearance: He is well-developed. HENT:      Head: Normocephalic. Eyes:      Extraocular Movements: Extraocular movements intact. Conjunctiva/sclera: Conjunctivae normal.   Cardiovascular:      Rate and Rhythm: Normal rate and regular rhythm. Heart sounds: Normal heart sounds. No murmur. Pulmonary:      Effort: Pulmonary effort is normal.      Breath sounds: Normal breath sounds. No wheezing or rales. Abdominal:      General: Bowel sounds are normal.      Palpations: Abdomen is soft. Tenderness: There is no abdominal tenderness. Musculoskeletal:      Right lower leg: No edema. Left lower leg: No edema. Neurological:      Mental Status: He is alert.       Comments: Cranial nerves grossly intact   Psychiatric:         Mood and Affect: Mood normal. ASSESSMENT/PLAN:  Urban Suzan was seen today for hypertension. Diagnoses and all orders for this visit:    Essential hypertension  Comments:  Mildly elevated. Asymptomatic. Blood pressure log at home normal.  Goal blood pressures less than 150/90. Return in 1 week nurse visit for BP, bring cuff. Orders:  -     EKG 12 lead; Future  -     EKG 12 lead    DDD (degenerative disc disease), cervical  Comments: In PT. Improving. Radicular symptoms improving. Lipoma of neck  Comments:  Pt to see gen surg    Neuropathy  Comments:  No issue since last apt. Feels it may be due to the shoes he wears. EKG similar. Consider PFTs due to hx of wheezing  Consider Sleep study due to snoring and waking up gasping for air. Next step once BP has been addressed is Stress test.     To ER with new or worsening symptoms. Return in about 1 week (around 1/25/2021). An [employer]ature was used to authenticate this note.     --Kenia May MD on 1/18/21 at 10:17 AM EST

## 2021-01-19 ENCOUNTER — OFFICE VISIT (OUTPATIENT)
Dept: SURGERY | Age: 61
End: 2021-01-19
Payer: MEDICAID

## 2021-01-19 VITALS
SYSTOLIC BLOOD PRESSURE: 152 MMHG | RESPIRATION RATE: 18 BRPM | BODY MASS INDEX: 33.88 KG/M2 | WEIGHT: 210.8 LBS | HEART RATE: 61 BPM | HEIGHT: 66 IN | TEMPERATURE: 97.5 F | OXYGEN SATURATION: 99 % | DIASTOLIC BLOOD PRESSURE: 71 MMHG

## 2021-01-19 DIAGNOSIS — R22.1 MASS OF RIGHT SIDE OF NECK: Primary | ICD-10-CM

## 2021-01-19 PROCEDURE — 99203 OFFICE O/P NEW LOW 30 MIN: CPT | Performed by: SURGERY

## 2021-01-19 PROCEDURE — 3017F COLORECTAL CA SCREEN DOC REV: CPT | Performed by: SURGERY

## 2021-01-19 PROCEDURE — 1036F TOBACCO NON-USER: CPT | Performed by: SURGERY

## 2021-01-19 PROCEDURE — G8484 FLU IMMUNIZE NO ADMIN: HCPCS | Performed by: SURGERY

## 2021-01-19 PROCEDURE — G8427 DOCREV CUR MEDS BY ELIG CLIN: HCPCS | Performed by: SURGERY

## 2021-01-19 PROCEDURE — G8417 CALC BMI ABV UP PARAM F/U: HCPCS | Performed by: SURGERY

## 2021-01-19 NOTE — PROGRESS NOTES
[facility] Note    Assessment/Plan:      Diagnosis Orders   1. Mass of right side of neck      Likely benign lipoma versus cyst.  We will plan for excision. Return for Surgery. Chief Complaint   Patient presents with    New Patient     ref by Dr. Roth Poster for lipoma of the neck. back of the rt ear. PCP: Brigid Alas MD    HPI: Laine Davis is a 63 y.o. male who presents in consultation for neck mass. It is on the right side behind his ear. He has noticed it for over a year. Is gotten bigger over the course of time. It does occasionally cause some pain and discomfort. He has never been infected or needed drained before. He does have some neck and what sounds to be nerve issues from injuries in the past, nothing is unrelated to this. He had an ultrasound showing scarring versus lipoma. Past Medical History:   Diagnosis Date    Anxiety     Herpes     Hypertension     Nephrolithiasis        Past Surgical History:   Procedure Laterality Date    COLONOSCOPY      few years ago    FINGER SURGERY Left 31    left hand surgery - tendons       Prior to Admission medications    Not on File       Allergies   Allergen Reactions    Codeine Other (See Comments)     irritability       Social History     Socioeconomic History    Marital status: Legally      Spouse name: None    Number of children: None    Years of education: None    Highest education level: None   Occupational History    None   Social Needs    Financial resource strain: None    Food insecurity     Worry: None     Inability: None    Transportation needs     Medical: None     Non-medical: None   Tobacco Use    Smoking status: Former Smoker     Packs/day: 0.50     Years: 15.00     Pack years: 7.50     Types: Cigarettes     Start date: 1975     Quit date: 1990     Years since quittin.0    Smokeless tobacco: Never Used   Substance and Sexual Activity    Alcohol use:  Yes Alcohol/week: 2.0 standard drinks     Types: 2 Cans of beer per week     Comment: occasional    Drug use: No    Sexual activity: Yes     Partners: Female   Lifestyle    Physical activity     Days per week: None     Minutes per session: None    Stress: None   Relationships    Social connections     Talks on phone: None     Gets together: None     Attends Oriental orthodox service: None     Active member of club or organization: None     Attends meetings of clubs or organizations: None     Relationship status: None    Intimate partner violence     Fear of current or ex partner: None     Emotionally abused: None     Physically abused: None     Forced sexual activity: None   Other Topics Concern    None   Social History Narrative    None       Family History   Problem Relation Age of Onset    Cancer Mother 79        ovarian    Depression Sister     Substance Abuse Brother     No Known Problems Sister     No Known Problems Sister     No Known Problems Sister     No Known Problems Sister     No Known Problems Brother     Alcohol Abuse Son     Substance Abuse Son        Review of Systems   All other systems reviewed and are negative. Objective:  Vitals:    01/19/21 1307   BP: (!) 152/71   Site: Left Upper Arm   Position: Sitting   Cuff Size: Medium Adult   Pulse: 61   Resp: 18   Temp: 97.5 °F (36.4 °C)   TempSrc: Temporal   SpO2: 99%   Weight: 210 lb 12.8 oz (95.6 kg)   Height: 5' 6\" (1.676 m)          Physical Exam  Constitutional:       General: He is not in acute distress. Appearance: He is not diaphoretic. HENT:      Head: Normocephalic and atraumatic. Eyes:      General:         Right eye: No discharge. Left eye: No discharge. Neck:      Trachea: No tracheal deviation. Cardiovascular:      Rate and Rhythm: Normal rate. Pulmonary:      Effort: Pulmonary effort is normal. No respiratory distress. Abdominal:      General: There is no distension. Palpations: Abdomen is soft. Tenderness: There is no abdominal tenderness. There is no guarding or rebound. Skin:     General: Skin is warm and dry. Neurological:      Mental Status: He is alert and oriented to person, place, and time. Ronny Camara MD  1/19/2021    NOTE: This report, in part or full,may have been transcribed using voice recognition software. Every effort was made to ensure accuracy; however, inadvertent computerized transcription errors may be present. Please excuse any transcriptional grammatical or spelling errors that may have escaped my editorial review.     CC: Benny Mohan MD

## 2021-01-20 ENCOUNTER — TELEPHONE (OUTPATIENT)
Dept: SURGERY | Age: 61
End: 2021-01-20

## 2021-01-20 NOTE — TELEPHONE ENCOUNTER
Otis He is scheduled for excision mass rt neck with Dr Lee Damian on 02-17-21 at [facility] at 1:00 pm. Patient was told to arrive at 11:00 am. Patient needs to be NPO after midnight the night before procedure. All surgery instructions were explained to the patient and a surgery letter was also mailed out. MA informed patient that PAT will also be calling to review pre-op instructions and medications. Patient verbalized understanding.   Electronically signed by Sam Velasco MA on 1/20/2021 at 11:24 AM

## 2021-01-20 NOTE — TELEPHONE ENCOUNTER
Prior Authorization Form:      DEMOGRAPHICS:                     Patient Name:  Milagros Lopez  Patient :  1960            Insurance:  Payor: [facility] / Plan: [employer] / Product Type: *No Product type* /   Insurance ID Number:    Payor/Plan Subscr  Sex Relation Sub.  Ins. ID Effective Group Num   1. [facility] 1960 Male Self [id number] 1/1/15 [id number]                                   [address]         DIAGNOSIS & PROCEDURE:                       Procedure/Operation: Excision mass rt neck           CPT Code: 34169    Diagnosis:  Neck mass    ICD10 Code: R22.1    Location:  [facility]    Surgeon:  Dr Mukesh Lanza INFORMATION:                          Date: 21    Time: 1:00 pm              Anesthesia:  [facility]                                                       Status:  Outpatient        Special Comments:         Electronically signed by Lei June MA on 2021 at 11:25 AM

## 2021-02-12 ENCOUNTER — TELEPHONE (OUTPATIENT)
Dept: SURGERY | Age: 61
End: 2021-02-12

## 2021-02-12 NOTE — TELEPHONE ENCOUNTER
Patient wants to cancel his surgery for excision neck mass because he is refusing to have the Covid test done.   Electronically signed by Mar Dixon MA on 2/12/2021 at 1:30 PM

## 2021-04-05 ENCOUNTER — APPOINTMENT (OUTPATIENT)
Dept: GENERAL RADIOLOGY | Age: 61
End: 2021-04-05
Payer: MEDICAID

## 2021-04-05 LAB
ALBUMIN SERPL-MCNC: 4.3 G/DL (ref 3.5–5.2)
ALP BLD-CCNC: 71 U/L (ref 40–129)
ALT SERPL-CCNC: 21 U/L (ref 0–40)
ANION GAP SERPL CALCULATED.3IONS-SCNC: 11 MMOL/L (ref 7–16)
AST SERPL-CCNC: 19 U/L (ref 0–39)
BACTERIA: ABNORMAL /HPF
BILIRUB SERPL-MCNC: 0.9 MG/DL (ref 0–1.2)
BILIRUBIN URINE: NEGATIVE
BLOOD, URINE: ABNORMAL
BUN BLDV-MCNC: 11 MG/DL (ref 8–23)
CALCIUM SERPL-MCNC: 9.2 MG/DL (ref 8.6–10.2)
CHLORIDE BLD-SCNC: 101 MMOL/L (ref 98–107)
CLARITY: CLEAR
CO2: 23 MMOL/L (ref 22–29)
COLOR: YELLOW
CREAT SERPL-MCNC: 1.1 MG/DL (ref 0.7–1.2)
GFR AFRICAN AMERICAN: >60
GFR NON-AFRICAN AMERICAN: >60 ML/MIN/1.73
GLUCOSE BLD-MCNC: 87 MG/DL (ref 74–99)
GLUCOSE URINE: NEGATIVE MG/DL
HCT VFR BLD CALC: 46 % (ref 37–54)
HEMOGLOBIN: 15.6 G/DL (ref 12.5–16.5)
KETONES, URINE: 40 MG/DL
LEUKOCYTE ESTERASE, URINE: NEGATIVE
LIPASE: 28 U/L (ref 13–60)
MCH RBC QN AUTO: 32.3 PG (ref 26–35)
MCHC RBC AUTO-ENTMCNC: 33.9 % (ref 32–34.5)
MCV RBC AUTO: 95.2 FL (ref 80–99.9)
NITRITE, URINE: NEGATIVE
PDW BLD-RTO: 12.6 FL (ref 11.5–15)
PH UA: 7.5 (ref 5–9)
PLATELET # BLD: 307 E9/L (ref 130–450)
PMV BLD AUTO: 9.3 FL (ref 7–12)
POTASSIUM SERPL-SCNC: 4.2 MMOL/L (ref 3.5–5)
PROTEIN UA: NEGATIVE MG/DL
RBC # BLD: 4.83 E12/L (ref 3.8–5.8)
RBC UA: ABNORMAL /HPF (ref 0–2)
SODIUM BLD-SCNC: 135 MMOL/L (ref 132–146)
SPECIFIC GRAVITY UA: 1.01 (ref 1–1.03)
TOTAL PROTEIN: 7.9 G/DL (ref 6.4–8.3)
TROPONIN: <0.01 NG/ML (ref 0–0.03)
UROBILINOGEN, URINE: 1 E.U./DL
WBC # BLD: 12.3 E9/L (ref 4.5–11.5)
WBC UA: ABNORMAL /HPF (ref 0–5)

## 2021-04-05 PROCEDURE — 93005 ELECTROCARDIOGRAM TRACING: CPT | Performed by: NURSE PRACTITIONER

## 2021-04-05 PROCEDURE — 99283 EMERGENCY DEPT VISIT LOW MDM: CPT

## 2021-04-05 PROCEDURE — 85027 COMPLETE CBC AUTOMATED: CPT

## 2021-04-05 PROCEDURE — 80053 COMPREHEN METABOLIC PANEL: CPT

## 2021-04-05 PROCEDURE — 84484 ASSAY OF TROPONIN QUANT: CPT

## 2021-04-05 PROCEDURE — 81001 URINALYSIS AUTO W/SCOPE: CPT

## 2021-04-05 PROCEDURE — 36415 COLL VENOUS BLD VENIPUNCTURE: CPT

## 2021-04-05 PROCEDURE — 71046 X-RAY EXAM CHEST 2 VIEWS: CPT

## 2021-04-05 PROCEDURE — 96372 THER/PROPH/DIAG INJ SC/IM: CPT

## 2021-04-05 PROCEDURE — 83690 ASSAY OF LIPASE: CPT

## 2021-04-05 ASSESSMENT — PAIN SCALES - GENERAL: PAINLEVEL_OUTOF10: 5

## 2021-04-06 ENCOUNTER — HOSPITAL ENCOUNTER (EMERGENCY)
Age: 61
Discharge: HOME | End: 2021-04-06
Attending: EMERGENCY MEDICINE
Payer: MEDICAID

## 2021-04-06 ENCOUNTER — APPOINTMENT (OUTPATIENT)
Dept: CT IMAGING | Age: 61
End: 2021-04-06
Payer: MEDICAID

## 2021-04-06 VITALS
TEMPERATURE: 97.8 F | SYSTOLIC BLOOD PRESSURE: 152 MMHG | RESPIRATION RATE: 18 BRPM | DIASTOLIC BLOOD PRESSURE: 64 MMHG | HEART RATE: 82 BPM | OXYGEN SATURATION: 94 %

## 2021-04-06 DIAGNOSIS — R10.9 FLANK PAIN: Primary | ICD-10-CM

## 2021-04-06 DIAGNOSIS — K57.90 DIVERTICULOSIS: ICD-10-CM

## 2021-04-06 LAB
EKG ATRIAL RATE: 60 BPM
EKG P AXIS: 42 DEGREES
EKG P-R INTERVAL: 166 MS
EKG Q-T INTERVAL: 412 MS
EKG QRS DURATION: 108 MS
EKG QTC CALCULATION (BAZETT): 412 MS
EKG R AXIS: -48 DEGREES
EKG T AXIS: 31 DEGREES
EKG VENTRICULAR RATE: 60 BPM

## 2021-04-06 PROCEDURE — 96372 THER/PROPH/DIAG INJ SC/IM: CPT

## 2021-04-06 PROCEDURE — 74176 CT ABD & PELVIS W/O CONTRAST: CPT

## 2021-04-06 PROCEDURE — 6360000002 HC RX W HCPCS: Performed by: EMERGENCY MEDICINE

## 2021-04-06 RX ORDER — DICYCLOMINE HYDROCHLORIDE 10 MG/ML
20 INJECTION INTRAMUSCULAR ONCE
Status: COMPLETED | OUTPATIENT
Start: 2021-04-06 | End: 2021-04-06

## 2021-04-06 RX ORDER — KETOROLAC TROMETHAMINE 30 MG/ML
15 INJECTION, SOLUTION INTRAMUSCULAR; INTRAVENOUS ONCE
Status: COMPLETED | OUTPATIENT
Start: 2021-04-06 | End: 2021-04-06

## 2021-04-06 RX ORDER — POLYETHYLENE GLYCOL 3350 17 G/17G
17 POWDER, FOR SOLUTION ORAL DAILY
Qty: 85 G | Refills: 0 | Status: SHIPPED | OUTPATIENT
Start: 2021-04-06 | End: 2021-04-11

## 2021-04-06 RX ORDER — DICYCLOMINE HCL 20 MG
20 TABLET ORAL 4 TIMES DAILY PRN
Qty: 15 TABLET | Refills: 0 | Status: SHIPPED | OUTPATIENT
Start: 2021-04-06 | End: 2021-10-18

## 2021-04-06 RX ADMIN — DICYCLOMINE HYDROCHLORIDE 20 MG: 20 INJECTION, SOLUTION INTRAMUSCULAR at 02:21

## 2021-04-06 RX ADMIN — KETOROLAC TROMETHAMINE 15 MG: 30 INJECTION, SOLUTION INTRAMUSCULAR; INTRAVENOUS at 00:20

## 2021-04-06 ASSESSMENT — PAIN SCALES - GENERAL: PAINLEVEL_OUTOF10: 5

## 2021-04-06 NOTE — ED PROVIDER NOTES
HPI:  4/6/21,   Time: 1:11 AM EDT       Curtis Gutierrez is a 63 y.o. male presenting to the ED for flank pain, beginning 4 days ago. The complaint has been persistent, mild in severity, and worsened by nothing. The patient states over the last 4 days he has been having right-sided flank pain. He states that it is a sharp stabbing pain that has been intermittent. He states that when he lays down he gets worse. He states he has had some mild nausea but denies any vomiting. No diarrhea. No trouble with his bowel movement. Denies any chest pain or shortness of breath. No numbness tingling. No urinary symptoms. Review of Systems:   Pertinent positives and negatives are stated within HPI, all other systems reviewed and are negative.          --------------------------------------------- PAST HISTORY ---------------------------------------------  Past Medical History:  has a past medical history of Anxiety, Herpes, Hypertension, and Nephrolithiasis. Past Surgical History:  has a past surgical history that includes Finger surgery (Left, 25) and Colonoscopy. Social History:  reports that he quit smoking about 31 years ago. His smoking use included cigarettes. He started smoking about 46 years ago. He has a 7.50 pack-year smoking history. He has never used smokeless tobacco. He reports current alcohol use of about 2.0 standard drinks of alcohol per week. He reports that he does not use drugs. Family History: family history includes Alcohol Abuse in his son; Cancer (age of onset: 79) in his mother; Depression in his sister; No Known Problems in his brother, sister, sister, sister, and sister; Substance Abuse in his brother and son. The patients home medications have been reviewed.     Allergies: Codeine        ---------------------------------------------------PHYSICAL EXAM--------------------------------------    Constitutional/General: Alert and oriented x3, well appearing, non toxic in NAD  Head: Normocephalic and atraumatic  Eyes: PERRL, EOMI, conjunctive normal, sclera non icteric  Mouth: Oropharynx clear, handling secretions, no trismus, no asymmetry of the posterior oropharynx or uvular edema  Neck: Supple, full ROM, non tender to palpation in the midline, no stridor, no crepitus, no meningeal signs  Respiratory: Lungs clear to auscultation bilaterally, no wheezes, rales, or rhonchi. Not in respiratory distress  Cardiovascular:  Regular rate. Regular rhythm. No murmurs, gallops, or rubs. 2+ distal pulses  GI:  Abdomen Soft, Non tender, Non distended. +BS. No organomegaly, no palpable masses,  No rebound, guarding, or rigidity. Right CVA tenderness  Musculoskeletal: Moves all extremities x 4. Warm and well perfused, no clubbing, cyanosis, or edema. Capillary refill <3 seconds  Integument: skin warm and dry. No rashes. Neurologic: GCS 15, no focal deficits, symmetric strength 5/5 in the upper and lower extremities bilaterally  Psychiatric: Normal Affect    -------------------------------------------------- RESULTS -------------------------------------------------  I have personally reviewed all laboratory and imaging results for this patient. Results are listed below.      LABS:  Results for orders placed or performed during the hospital encounter of 04/06/21   CBC   Result Value Ref Range    WBC 12.3 (H) 4.5 - 11.5 E9/L    RBC 4.83 3.80 - 5.80 E12/L    Hemoglobin 15.6 12.5 - 16.5 g/dL    Hematocrit 46.0 37.0 - 54.0 %    MCV 95.2 80.0 - 99.9 fL    MCH 32.3 26.0 - 35.0 pg    MCHC 33.9 32.0 - 34.5 %    RDW 12.6 11.5 - 15.0 fL    Platelets 329 057 - 556 E9/L    MPV 9.3 7.0 - 12.0 fL   Comprehensive Metabolic Panel   Result Value Ref Range    Sodium 135 132 - 146 mmol/L    Potassium 4.2 3.5 - 5.0 mmol/L    Chloride 101 98 - 107 mmol/L    CO2 23 22 - 29 mmol/L    Anion Gap 11 7 - 16 mmol/L    Glucose 87 74 - 99 mg/dL    BUN 11 8 - 23 mg/dL    CREATININE 1.1 0.7 - 1.2 mg/dL    GFR Non-African American >60 >=60 mL/min/1.73    GFR African American >60     Calcium 9.2 8.6 - 10.2 mg/dL    Total Protein 7.9 6.4 - 8.3 g/dL    Albumin 4.3 3.5 - 5.2 g/dL    Total Bilirubin 0.9 0.0 - 1.2 mg/dL    Alkaline Phosphatase 71 40 - 129 U/L    ALT 21 0 - 40 U/L    AST 19 0 - 39 U/L   Troponin   Result Value Ref Range    Troponin <0.01 0.00 - 0.03 ng/mL   Urinalysis with Microscopic   Result Value Ref Range    Color, UA Yellow Straw/Yellow    Clarity, UA Clear Clear    Glucose, Ur Negative Negative mg/dL    Bilirubin Urine Negative Negative    Ketones, Urine 40 (A) Negative mg/dL    Specific Gravity, UA 1.015 1.005 - 1.030    Blood, Urine TRACE-INTACT Negative    pH, UA 7.5 5.0 - 9.0    Protein, UA Negative Negative mg/dL    Urobilinogen, Urine 1.0 <2.0 E.U./dL    Nitrite, Urine Negative Negative    Leukocyte Esterase, Urine Negative Negative    WBC, UA NONE 0 - 5 /HPF    RBC, UA 1-3 0 - 2 /HPF    Bacteria, UA NONE SEEN None Seen /HPF   Lipase   Result Value Ref Range    Lipase 28 13 - 60 U/L   EKG 12 Lead   Result Value Ref Range    Ventricular Rate 60 BPM    Atrial Rate 60 BPM    P-R Interval 166 ms    QRS Duration 108 ms    Q-T Interval 412 ms    QTc Calculation (Bazett) 412 ms    P Axis 42 degrees    R Axis -48 degrees    T Axis 31 degrees       RADIOLOGY:  Interpreted by Radiologist.  CT ABDOMEN PELVIS WO CONTRAST Additional Contrast? None   Final Result   A punctate nonobstructing stone in the left kidney. No additional radiopaque urolithiasis or hydroureteronephrosis on either side. Distal colonic diverticulosis. No acute diverticulitis. XR CHEST (2 VW)   Final Result   No acute process. EKG:  This EKG is signed and interpreted by the EP. EKG shows normal sinus rhythm at 60 bpm.  Left anterior fascicular block noted. Nonspecific ST-T wave changes noted. Normal intervals otherwise.   No STEMI.    ------------------------- NURSING NOTES AND VITALS REVIEWED ---------------------------   The nursing notes within the ED encounter and vital signs as below have been reviewed by myself. BP (!) 152/64   Pulse 82   Temp 97.8 °F (36.6 °C) (Infrared)   Resp 18   SpO2 94%   Oxygen Saturation Interpretation: Normal    The patients available past medical records and past encounters were reviewed. ------------------------------ ED COURSE/MEDICAL DECISION MAKING----------------------  Medications   ketorolac (TORADOL) injection 15 mg (15 mg Intramuscular Given 4/6/21 0020)   dicyclomine (BENTYL) injection 20 mg (20 mg Intramuscular Given 4/6/21 0221)         ED COURSE:       Medical Decision Making: This is a 59-year-old male presented to the ED for flank pain. Patient underwent laboratory work-up showed a leukocytosis of 12.3. Normal chemistry. Negative troponin. Normal urinalysis except for 1-3 RBCs. Lipase normal.  EKG showed no ischemic findings. Chest x-ray unremarkable. Patient CT imaging showed no acute findings except for diverticulosis with no signs of diverticulitis. Punctate nonobstructing stone noted in the left kidney. No other acute findings noted. Patient was given Toradol with improvement of pain but did have intermittent cramping afterwards therefore he was given an additional dose of Bentyl. Patient to be treated conservatively. On reevaluation abdomen soft and nontender. No rigidity or guarding. Patient be placed on Bentyl as well as MiraLAX for symptomatic treatment. Return precautions given. Patient agrees with plan. I, Dr. Alma Espino, am the primary provider for this encounter    This patient's ED course included: a personal history and physicial examination, re-evaluation prior to disposition, multiple bedside re-evaluations, IV medications, cardiac monitoring and continuous pulse oximetry    This patient has remained hemodynamically stable during their ED course. Re-Evaluations:             Re-evaluation.   Patients symptoms are improving      Counseling: The emergency provider has spoken with the patient and discussed todays results, in addition to providing specific details for the plan of care and counseling regarding the diagnosis and prognosis. Questions are answered at this time and they are agreeable with the plan.       --------------------------------- IMPRESSION AND DISPOSITION ---------------------------------    IMPRESSION  1. Flank pain    2. Diverticulosis        DISPOSITION  Disposition: Discharge to home  Patient condition is stable    NOTE: This report was transcribed using voice recognition software.  Every effort was made to ensure accuracy; however, inadvertent computerized transcription errors may be present        Charity Jorge DO  04/06/21 0327

## 2021-04-07 ENCOUNTER — OFFICE VISIT (OUTPATIENT)
Dept: PRIMARY CARE CLINIC | Age: 61
End: 2021-04-07
Payer: MEDICAID

## 2021-04-07 VITALS
OXYGEN SATURATION: 95 % | BODY MASS INDEX: 33.75 KG/M2 | RESPIRATION RATE: 18 BRPM | HEART RATE: 55 BPM | DIASTOLIC BLOOD PRESSURE: 72 MMHG | HEIGHT: 66 IN | WEIGHT: 210 LBS | SYSTOLIC BLOOD PRESSURE: 150 MMHG | TEMPERATURE: 97.5 F

## 2021-04-07 DIAGNOSIS — K57.90 DIVERTICULOSIS: ICD-10-CM

## 2021-04-07 DIAGNOSIS — K40.90 RIGHT GROIN HERNIA: Primary | ICD-10-CM

## 2021-04-07 PROCEDURE — 1036F TOBACCO NON-USER: CPT | Performed by: FAMILY MEDICINE

## 2021-04-07 PROCEDURE — G8427 DOCREV CUR MEDS BY ELIG CLIN: HCPCS | Performed by: FAMILY MEDICINE

## 2021-04-07 PROCEDURE — G8417 CALC BMI ABV UP PARAM F/U: HCPCS | Performed by: FAMILY MEDICINE

## 2021-04-07 PROCEDURE — 99213 OFFICE O/P EST LOW 20 MIN: CPT | Performed by: FAMILY MEDICINE

## 2021-04-07 PROCEDURE — 3017F COLORECTAL CA SCREEN DOC REV: CPT | Performed by: FAMILY MEDICINE

## 2021-04-07 RX ORDER — AMOXICILLIN AND CLAVULANATE POTASSIUM 875; 125 MG/1; MG/1
1 TABLET, FILM COATED ORAL 3 TIMES DAILY
Qty: 21 TABLET | Refills: 0 | Status: CANCELLED | OUTPATIENT
Start: 2021-04-07 | End: 2021-04-14

## 2021-04-07 NOTE — PROGRESS NOTES
21  Cortney Hendrickson : 1960 Sex: male  Age: 63 y.o. Chief Complaint   Patient presents with    Abdominal Pain     ER follow up     HPI: The patient states that they have had abdominal pain for the last 6 days ago. The pain is described as mild to mod pain that is persistent and is located in the right-sided w/ radiation to the groin. At times is intermittent sharp pain. Worse when he lays down. Some nausea. No change in BM. The patient denies vomiting. Bowel movements have been normal color and consistency. No diarrhea. No black or bloody stools. The patient denies dysuria, urinary frequency, urgency and or gross hematuria. No fevers or chills. No chest pain or dyspnea. No true flank pain. Vision was seen in the emergency department on the  for this issue. Emergency department note was reviewed. Patient had a leukocytosis, chemistry, negative troponin, normal urinalysis, normal lipase, EKG, chest x-ray did not show any acute changes area patient had a CT that showed diverticulosis a punctate nonobstructing stone in the left kidney. Patient was given Toradol and Bentyl and discharged home. Patient was also advised on continued usage of Bentyl and MiraLAX. Patient presents today to follow-up his ER visit he reports having persistent abdominal issues as noted above. ROS:  Const: Positives and pertinent negatives as per HPI. All others reviewed and are negative.          Current Outpatient Medications:     dicyclomine (BENTYL) 20 MG tablet, Take 1 tablet by mouth 4 times daily as needed (abdominal pain), Disp: 15 tablet, Rfl: 0    polyethylene glycol (GLYCOLAX) 17 GM/SCOOP powder, Take 17 g by mouth daily for 5 days, Disp: 85 g, Rfl: 0  Allergies   Allergen Reactions    Codeine Other (See Comments)     irritability       Past Medical History:   Diagnosis Date    Anxiety     Herpes     Hypertension     Nephrolithiasis      Past Surgical History:   Procedure Laterality Date    COLONOSCOPY      few years ago    FINGER SURGERY Left 25    left hand surgery - tendons     Family History   Problem Relation Age of Onset    Cancer Mother 79        ovarian    Depression Sister     Substance Abuse Brother     No Known Problems Sister     No Known Problems Sister     No Known Problems Sister     No Known Problems Sister     No Known Problems Brother     Alcohol Abuse Son     Substance Abuse Son      Social History     Socioeconomic History    Marital status: Legally      Spouse name: Not on file    Number of children: Not on file    Years of education: Not on file    Highest education level: Not on file   Occupational History    Not on file   Social Needs    Financial resource strain: Not on file    Food insecurity     Worry: Not on file     Inability: Not on file    Transportation needs     Medical: Not on file     Non-medical: Not on file   Tobacco Use    Smoking status: Former Smoker     Packs/day: 0.50     Years: 15.00     Pack years: 7.50     Types: Cigarettes     Start date: 1975     Quit date: 1990     Years since quittin.2    Smokeless tobacco: Never Used   Substance and Sexual Activity    Alcohol use:  Yes     Alcohol/week: 2.0 standard drinks     Types: 2 Cans of beer per week     Comment: occasional    Drug use: No    Sexual activity: Yes     Partners: Female   Lifestyle    Physical activity     Days per week: Not on file     Minutes per session: Not on file    Stress: Not on file   Relationships    Social connections     Talks on phone: Not on file     Gets together: Not on file     Attends [employer] service: Not on file     Active member of club or organization: Not on file     Attends meetings of clubs or organizations: Not on file     Relationship status: Not on file    Intimate partner violence     Fear of current or ex partner: Not on file     Emotionally abused: Not on file     Physically abused: Not on file     Forced sexual activity: Not on file   Other Topics Concern    Not on file   Social History Narrative    Not on file       Vitals:    04/07/21 1131   BP: (!) 150/72   Pulse: 55   Resp: 18   Temp: 97.5 °F (36.4 °C)   TempSrc: Temporal   SpO2: 95%   Weight: 210 lb (95.3 kg)   Height: 5' 6\" (1.676 m)       Exam:  Physical Exam  Constitutional:       Appearance: He is well-developed. HENT:      Head: Normocephalic. Eyes:      Conjunctiva/sclera: Conjunctivae normal.      Pupils: Pupils are equal, round, and reactive to light. Cardiovascular:      Rate and Rhythm: Normal rate and regular rhythm. Heart sounds: Normal heart sounds. No murmur. Pulmonary:      Effort: Pulmonary effort is normal.      Breath sounds: Normal breath sounds. No wheezing or rales. Abdominal:      General: Bowel sounds are normal.      Palpations: Abdomen is soft. Tenderness: There is no abdominal tenderness. Hernia: A hernia is present. Hernia is present in the left inguinal area. Neurological:      Mental Status: He is alert. Comments: Cranial nerves grossly intact         Assessment and Plan:  Chelsi Rosa was seen today for abdominal pain. Diagnoses and all orders for this visit:    Right groin hernia  -     Al Villanueva MD, General Surgery, [address] Hassan MD, General Surgery, [address] [place] [address]    Review the patient's ER visit was performed today. Labs reviewed. Imaging reviewed. No reviewed. Exam today shows a right inguinal hernia. Patient's pain is most likely secondary to such. This was found on CT scan. Patient was advised to proceed to emergency department with any new or worsening symptoms. Take ibuprofen and/or Tylenol when necessary. Patient was referred to general surgery for possible surgical correction. Patient is to keep his office appointment with myself in 2 weeks for surgical clearance if necessary. Return in about 2 weeks (around 4/21/2021).     The above

## 2021-04-19 PROBLEM — H72.91 EAR DRUM PERFORATION, RIGHT: Status: RESOLVED | Noted: 2018-09-12 | Resolved: 2021-04-19

## 2021-04-19 PROBLEM — M50.30 DDD (DEGENERATIVE DISC DISEASE), CERVICAL: Status: ACTIVE | Noted: 2021-04-19

## 2021-04-19 PROBLEM — K40.90 RIGHT GROIN HERNIA: Status: ACTIVE | Noted: 2021-04-19

## 2021-04-19 PROBLEM — K57.90 DIVERTICULOSIS: Status: ACTIVE | Noted: 2021-04-19

## 2021-10-18 ENCOUNTER — OFFICE VISIT (OUTPATIENT)
Dept: PRIMARY CARE CLINIC | Age: 61
End: 2021-10-18
Payer: MEDICAID

## 2021-10-18 VITALS
DIASTOLIC BLOOD PRESSURE: 70 MMHG | HEART RATE: 73 BPM | BODY MASS INDEX: 35.2 KG/M2 | SYSTOLIC BLOOD PRESSURE: 174 MMHG | TEMPERATURE: 97.4 F | OXYGEN SATURATION: 97 % | HEIGHT: 66 IN | RESPIRATION RATE: 18 BRPM | WEIGHT: 219 LBS

## 2021-10-18 DIAGNOSIS — R07.89 ATYPICAL CHEST PAIN: ICD-10-CM

## 2021-10-18 DIAGNOSIS — R07.0 THROAT PAIN: ICD-10-CM

## 2021-10-18 DIAGNOSIS — I10 ESSENTIAL HYPERTENSION: ICD-10-CM

## 2021-10-18 DIAGNOSIS — R05.9 COUGH: Primary | ICD-10-CM

## 2021-10-18 LAB
ALBUMIN SERPL-MCNC: 4.3 G/DL (ref 3.5–5.2)
ALP BLD-CCNC: 74 U/L (ref 40–129)
ALT SERPL-CCNC: 26 U/L (ref 0–40)
ANION GAP SERPL CALCULATED.3IONS-SCNC: 9 MMOL/L (ref 7–16)
AST SERPL-CCNC: 22 U/L (ref 0–39)
BASOPHILS ABSOLUTE: 0.07 E9/L (ref 0–0.2)
BASOPHILS RELATIVE PERCENT: 0.7 % (ref 0–2)
BILIRUB SERPL-MCNC: 0.4 MG/DL (ref 0–1.2)
BUN BLDV-MCNC: 12 MG/DL (ref 6–23)
C-REACTIVE PROTEIN, HIGH SENSITIVITY: 3.6 MG/L (ref 0–3)
CALCIUM SERPL-MCNC: 8.9 MG/DL (ref 8.6–10.2)
CHLORIDE BLD-SCNC: 102 MMOL/L (ref 98–107)
CHOLESTEROL, TOTAL: 170 MG/DL (ref 0–199)
CO2: 25 MMOL/L (ref 22–29)
CREAT SERPL-MCNC: 0.9 MG/DL (ref 0.7–1.2)
EOSINOPHILS ABSOLUTE: 0.22 E9/L (ref 0.05–0.5)
EOSINOPHILS RELATIVE PERCENT: 2.1 % (ref 0–6)
GFR AFRICAN AMERICAN: >60
GFR NON-AFRICAN AMERICAN: >60 ML/MIN/1.73
GLUCOSE BLD-MCNC: 101 MG/DL (ref 74–99)
HCT VFR BLD CALC: 41.9 % (ref 37–54)
HDLC SERPL-MCNC: 33 MG/DL
HEMOGLOBIN: 14.2 G/DL (ref 12.5–16.5)
IMMATURE GRANULOCYTES #: 0.05 E9/L
IMMATURE GRANULOCYTES %: 0.5 % (ref 0–5)
LDL CHOLESTEROL CALCULATED: 58 MG/DL (ref 0–99)
LYMPHOCYTES ABSOLUTE: 2.9 E9/L (ref 1.5–4)
LYMPHOCYTES RELATIVE PERCENT: 27.3 % (ref 20–42)
MCH RBC QN AUTO: 32.4 PG (ref 26–35)
MCHC RBC AUTO-ENTMCNC: 33.9 % (ref 32–34.5)
MCV RBC AUTO: 95.7 FL (ref 80–99.9)
MONOCYTES ABSOLUTE: 1.14 E9/L (ref 0.1–0.95)
MONOCYTES RELATIVE PERCENT: 10.7 % (ref 2–12)
NEUTROPHILS ABSOLUTE: 6.25 E9/L (ref 1.8–7.3)
NEUTROPHILS RELATIVE PERCENT: 58.7 % (ref 43–80)
PDW BLD-RTO: 12.8 FL (ref 11.5–15)
PLATELET # BLD: 336 E9/L (ref 130–450)
PMV BLD AUTO: 9.9 FL (ref 7–12)
POTASSIUM SERPL-SCNC: 4.1 MMOL/L (ref 3.5–5)
RBC # BLD: 4.38 E12/L (ref 3.8–5.8)
SODIUM BLD-SCNC: 136 MMOL/L (ref 132–146)
TOTAL PROTEIN: 6.9 G/DL (ref 6.4–8.3)
TRIGL SERPL-MCNC: 397 MG/DL (ref 0–149)
TROPONIN, HIGH SENSITIVITY: <6 NG/L (ref 0–11)
VLDLC SERPL CALC-MCNC: 79 MG/DL
WBC # BLD: 10.6 E9/L (ref 4.5–11.5)

## 2021-10-18 PROCEDURE — 99214 OFFICE O/P EST MOD 30 MIN: CPT | Performed by: FAMILY MEDICINE

## 2021-10-18 PROCEDURE — 93000 ELECTROCARDIOGRAM COMPLETE: CPT | Performed by: FAMILY MEDICINE

## 2021-10-18 PROCEDURE — G8427 DOCREV CUR MEDS BY ELIG CLIN: HCPCS | Performed by: FAMILY MEDICINE

## 2021-10-18 PROCEDURE — 3017F COLORECTAL CA SCREEN DOC REV: CPT | Performed by: FAMILY MEDICINE

## 2021-10-18 PROCEDURE — G8484 FLU IMMUNIZE NO ADMIN: HCPCS | Performed by: FAMILY MEDICINE

## 2021-10-18 PROCEDURE — G8417 CALC BMI ABV UP PARAM F/U: HCPCS | Performed by: FAMILY MEDICINE

## 2021-10-18 PROCEDURE — 1036F TOBACCO NON-USER: CPT | Performed by: FAMILY MEDICINE

## 2021-10-18 RX ORDER — ALBUTEROL SULFATE 90 UG/1
2 AEROSOL, METERED RESPIRATORY (INHALATION) 4 TIMES DAILY PRN
Qty: 18 G | Refills: 5 | Status: SHIPPED
Start: 2021-10-18 | End: 2022-05-25

## 2021-10-18 RX ORDER — BENZONATATE 100 MG/1
100 CAPSULE ORAL 3 TIMES DAILY PRN
Qty: 30 CAPSULE | Refills: 0 | Status: SHIPPED | OUTPATIENT
Start: 2021-10-18 | End: 2021-10-25

## 2021-10-18 SDOH — ECONOMIC STABILITY: FOOD INSECURITY: WITHIN THE PAST 12 MONTHS, THE FOOD YOU BOUGHT JUST DIDN'T LAST AND YOU DIDN'T HAVE MONEY TO GET MORE.: NEVER TRUE

## 2021-10-18 SDOH — ECONOMIC STABILITY: FOOD INSECURITY: WITHIN THE PAST 12 MONTHS, YOU WORRIED THAT YOUR FOOD WOULD RUN OUT BEFORE YOU GOT MONEY TO BUY MORE.: NEVER TRUE

## 2021-10-18 ASSESSMENT — ENCOUNTER SYMPTOMS
COUGH: 1
VOMITING: 0
SHORTNESS OF BREATH: 0
SORE THROAT: 0
WHEEZING: 0
DIARRHEA: 0
NAUSEA: 0
CHOKING: 0
RHINORRHEA: 1
CONSTIPATION: 0
ABDOMINAL PAIN: 0

## 2021-10-18 ASSESSMENT — SOCIAL DETERMINANTS OF HEALTH (SDOH): HOW HARD IS IT FOR YOU TO PAY FOR THE VERY BASICS LIKE FOOD, HOUSING, MEDICAL CARE, AND HEATING?: NOT HARD AT ALL

## 2021-10-18 NOTE — PROGRESS NOTES
10/18/2021     Chief Complaint   Patient presents with    Cough     feels like something is stuck in throat     HPI  Rossy Vazquez (:  1960) is a 57 y.o. male, here for evaluation of the following medical concerns:    Reports that he has a feeling of fullness in his throat. Reports he notices it mainly in the morning. Reports he does cough up some mucus that is clear. Had a cold that resolved about 1 week ago. Feels fullness in the lower aspect of his throat near his thyroid. Still can swallow food. No chocking. Has tried OTC medications with some mild relief. BP is elevated today. Patient in the past was seen in regards to blood pressure issues and atypical chest pain. Patient never followed up in regards to this issue. Patient's blood pressure has not been regularly checked at home. Review of Systems   Constitutional: Negative for chills and fever. HENT: Positive for postnasal drip and rhinorrhea. Negative for congestion and sore throat. Respiratory: Positive for cough. Negative for choking, shortness of breath and wheezing. Cardiovascular: Negative for chest pain and leg swelling. Pinching chest like discomfort. Gastrointestinal: Negative for abdominal pain, constipation, diarrhea, nausea and vomiting. Skin: Negative for rash. Neurological: Negative for light-headedness and headaches. Past Medical History:   Diagnosis Date    Anxiety     Herpes     Hypertension     Nephrolithiasis        Prior to Visit Medications    Medication Sig Taking?  Authorizing Provider   albuterol sulfate HFA (VENTOLIN HFA) 108 (90 Base) MCG/ACT inhaler Inhale 2 puffs into the lungs 4 times daily as needed for Wheezing Yes Randy Mcdonald MD   benzonatate (TESSALON) 100 MG capsule Take 1 capsule by mouth 3 times daily as needed for Cough Yes Randy Mcdonald MD        Allergies   Allergen Reactions    Codeine Other (See Comments)     irritability       Social History     Tobacco Use  Smoking status: Former Smoker     Packs/day: 0.50     Years: 15.00     Pack years: 7.50     Types: Cigarettes     Start date: 1975     Quit date: 1990     Years since quittin.8    Smokeless tobacco: Never Used   Substance Use Topics    Alcohol use: Yes     Alcohol/week: 2.0 standard drinks     Types: 2 Cans of beer per week     Comment: occasional           Vitals:    10/18/21 1530 10/18/21 1603   BP: (!) 178/68 (!) 174/70   Pulse: 73    Resp: 18    Temp: 97.4 °F (36.3 °C)    TempSrc: Temporal    SpO2: 97%    Weight: 219 lb (99.3 kg)    Height: 5' 6\" (1.676 m)      Estimated body mass index is 35.35 kg/m² as calculated from the following:    Height as of this encounter: 5' 6\" (1.676 m). Weight as of this encounter: 219 lb (99.3 kg). Physical Exam  Constitutional:       Appearance: He is well-developed. HENT:      Head: Normocephalic. Right Ear: Tympanic membrane normal.      Left Ear: Tympanic membrane normal.   Eyes:      Extraocular Movements: Extraocular movements intact. Conjunctiva/sclera: Conjunctivae normal.   Neck:      Comments: Normal auscultation of airflow through the patient's neck. Cardiovascular:      Rate and Rhythm: Normal rate and regular rhythm. Heart sounds: Normal heart sounds. No murmur heard. Pulmonary:      Effort: Pulmonary effort is normal.      Breath sounds: Normal breath sounds. No wheezing or rales. Abdominal:      General: Bowel sounds are normal.      Palpations: Abdomen is soft. Tenderness: There is no abdominal tenderness. Neurological:      General: No focal deficit present. Mental Status: He is alert. Comments: Cranial nerves grossly intact   Psychiatric:         Mood and Affect: Mood normal.         Judgment: Judgment normal.         ASSESSMENT/PLAN:  Ester Jeffers was seen today for cough. Diagnoses and all orders for this visit:    Cough  -     XR CHEST (2 VW);  Future  -     albuterol sulfate HFA (VENTOLIN HFA) 108 (90 Base) MCG/ACT inhaler; Inhale 2 puffs into the lungs 4 times daily as needed for Wheezing  -     benzonatate (TESSALON) 100 MG capsule; Take 1 capsule by mouth 3 times daily as needed for Cough    Throat pain  -     XR NECK SOFT TISSUE; Future    Atypical chest pain  -     CBC Auto Differential; Future  -     Comprehensive Metabolic Panel; Future  -     Lipid Panel; Future  -     HIGH SENSITIVITY CRP; Future  -     TROPONIN I; Future  -     EKG 12 lead; Future  -     EKG 12 lead    Essential hypertension    In regards to the patient's cough and throat irritation medications as noted above. Patient to start a Zyrtec or Claritin and nasal steroid spray. Concerns for COPD or similar like issue. Unable to place the patient on a steroid taper given his elevated blood pressure currently. In regards to the patient's elevated blood pressure he will need to check his blood pressure regularly/daily. Patient's blood pressure in the past has been elevated. Most likely will need to be placed on a blood pressure medication. Patient is to call us if his blood pressure is consistently elevated above 140/90 over the next several days to initiate a blood pressure medication over the phone. With any significantly new or worsening symptoms patient is to proceed immediately to the emergency department. Work-up in regards to atypical chest pain and hypertension was sought after in the past but patient failed to follow-up. Patient to start aspirin 81 mg daily. Return in about 1 week (around 10/25/2021) for Follow-up Appointment From Today's Visit, To Discuss Labs Results. An [employer] was used to authenticate this note.     --Uri Burch MD on 10/18/21 at 3:33 PM EDT

## 2021-10-25 ENCOUNTER — OFFICE VISIT (OUTPATIENT)
Dept: PRIMARY CARE CLINIC | Age: 61
End: 2021-10-25
Payer: MEDICAID

## 2021-10-25 VITALS
HEIGHT: 66 IN | TEMPERATURE: 97.4 F | HEART RATE: 72 BPM | RESPIRATION RATE: 24 BRPM | BODY MASS INDEX: 34.55 KG/M2 | WEIGHT: 215 LBS | DIASTOLIC BLOOD PRESSURE: 82 MMHG | SYSTOLIC BLOOD PRESSURE: 170 MMHG | OXYGEN SATURATION: 97 %

## 2021-10-25 DIAGNOSIS — R20.2 FACIAL PARESTHESIA: ICD-10-CM

## 2021-10-25 DIAGNOSIS — I10 ESSENTIAL HYPERTENSION: ICD-10-CM

## 2021-10-25 DIAGNOSIS — R07.89 ATYPICAL CHEST PAIN: ICD-10-CM

## 2021-10-25 DIAGNOSIS — K11.7 DROOLING FROM LEFT SIDE OF MOUTH: ICD-10-CM

## 2021-10-25 DIAGNOSIS — R05.9 COUGH: Primary | ICD-10-CM

## 2021-10-25 DIAGNOSIS — R07.0 THROAT PAIN: ICD-10-CM

## 2021-10-25 PROCEDURE — G8484 FLU IMMUNIZE NO ADMIN: HCPCS | Performed by: FAMILY MEDICINE

## 2021-10-25 PROCEDURE — 99214 OFFICE O/P EST MOD 30 MIN: CPT | Performed by: FAMILY MEDICINE

## 2021-10-25 PROCEDURE — 3017F COLORECTAL CA SCREEN DOC REV: CPT | Performed by: FAMILY MEDICINE

## 2021-10-25 PROCEDURE — G8417 CALC BMI ABV UP PARAM F/U: HCPCS | Performed by: FAMILY MEDICINE

## 2021-10-25 PROCEDURE — 1036F TOBACCO NON-USER: CPT | Performed by: FAMILY MEDICINE

## 2021-10-25 PROCEDURE — G8427 DOCREV CUR MEDS BY ELIG CLIN: HCPCS | Performed by: FAMILY MEDICINE

## 2021-10-25 RX ORDER — LOSARTAN POTASSIUM 50 MG/1
50 TABLET ORAL DAILY
Qty: 30 TABLET | Refills: 1 | Status: SHIPPED
Start: 2021-10-25 | End: 2021-11-24 | Stop reason: SDUPTHER

## 2021-10-25 ASSESSMENT — ENCOUNTER SYMPTOMS
DIARRHEA: 0
SHORTNESS OF BREATH: 0
SORE THROAT: 0
VOMITING: 0
RHINORRHEA: 1
WHEEZING: 0
CONSTIPATION: 0
COUGH: 1
ABDOMINAL PAIN: 0
NAUSEA: 0

## 2021-10-25 NOTE — PROGRESS NOTES
10/25/2021     Chief Complaint   Patient presents with    Hypertension    Cough     HPI  Rossy Vazquez (:  1960) is a 57 y.o. male, here for evaluation of the following medical concerns:    Patient was seen here approximately 1 week in regards to cough, throat, and atypical chest pain along with hypertension issues. CBC within normal limits. CMP within normal limits. Lipid panel with elevated triglycerides of 397. HDL 33. LDL 58. Total cholesterol 170. Hs-CRP 3.6. Troponin negative. Normal x-ray of the patient's neck and chest.    At the patient's last office appointment he was advised on Tessalon Perles, Zyrtec/Claritin and a nasal steroid spray. Reports that his cough gets worse after he eats. Not too bad when laying down. Patient did temporarily try the cough medicine but report no benefit. He has not tried anything else. Blood pressure is significantly elevated today. Has not been checking blood pressure at home. Reports at times he does have some drooling from the left side of his mouth and some numbness and tingling along his left jawline. Reports poor dentition. Reports atypical chest pain resolved. Review of Systems   Constitutional: Negative for chills and fever. HENT: Positive for congestion, postnasal drip and rhinorrhea. Negative for sore throat. Respiratory: Positive for cough. Negative for shortness of breath and wheezing. Cardiovascular: Negative for chest pain and leg swelling. Gastrointestinal: Negative for abdominal pain, constipation, diarrhea, nausea and vomiting. Skin: Negative for rash. Neurological: Positive for numbness. Negative for light-headedness and headaches. Past Medical History:   Diagnosis Date    Anxiety     Herpes     Hypertension     Nephrolithiasis        Prior to Visit Medications    Medication Sig Taking?  Authorizing Provider   losartan (COZAAR) 50 MG tablet Take 1 tablet by mouth daily Yes Randy Mcdonald MD albuterol sulfate HFA (VENTOLIN HFA) 108 (90 Base) MCG/ACT inhaler Inhale 2 puffs into the lungs 4 times daily as needed for Wheezing Yes Alcides Zimmerman MD   benzonatate (TESSALON) 100 MG capsule Take 1 capsule by mouth 3 times daily as needed for Cough Yes Alcides Zimmerman MD        Allergies   Allergen Reactions    Codeine Other (See Comments)     irritability       Social History     Tobacco Use    Smoking status: Former Smoker     Packs/day: 0.50     Years: 15.00     Pack years: 7.50     Types: Cigarettes     Start date: 1975     Quit date: 1990     Years since quittin.8    Smokeless tobacco: Never Used   Substance Use Topics    Alcohol use: Yes     Alcohol/week: 2.0 standard drinks     Types: 2 Cans of beer per week     Comment: occasional           Vitals:    10/25/21 1311   BP: (!) 170/82   Pulse: 72   Resp: 24   Temp: 97.4 °F (36.3 °C)   TempSrc: Temporal   SpO2: 97%   Weight: 215 lb (97.5 kg)   Height: 5' 6\" (1.676 m)     Estimated body mass index is 34.7 kg/m² as calculated from the following:    Height as of this encounter: 5' 6\" (1.676 m). Weight as of this encounter: 215 lb (97.5 kg). Physical Exam  Constitutional:       Appearance: He is well-developed. HENT:      Head: Normocephalic. Eyes:      Extraocular Movements: Extraocular movements intact. Conjunctiva/sclera: Conjunctivae normal.      Pupils: Pupils are equal, round, and reactive to light. Cardiovascular:      Rate and Rhythm: Normal rate and regular rhythm. Heart sounds: Normal heart sounds. No murmur heard. Pulmonary:      Effort: Pulmonary effort is normal.      Breath sounds: Normal breath sounds. No wheezing or rales. Abdominal:      General: Bowel sounds are normal.      Palpations: Abdomen is soft. Tenderness: There is no abdominal tenderness. Musculoskeletal:      Right lower leg: No edema. Left lower leg: No edema. Neurological:      General: No focal deficit present. Continue to follow. Referral to ENT. Return in about 1 week (around 11/1/2021) for Follow-up Appointment From Today's Visit. An electronicsignature was used to authenticate this note.     --Nithin Castle MD on 10/25/21 at 1:11 PM EDT

## 2021-11-01 ENCOUNTER — OFFICE VISIT (OUTPATIENT)
Dept: PRIMARY CARE CLINIC | Age: 61
End: 2021-11-01
Payer: MEDICAID

## 2021-11-01 VITALS
HEIGHT: 66 IN | HEART RATE: 68 BPM | BODY MASS INDEX: 34.72 KG/M2 | DIASTOLIC BLOOD PRESSURE: 74 MMHG | OXYGEN SATURATION: 97 % | SYSTOLIC BLOOD PRESSURE: 168 MMHG | TEMPERATURE: 97.7 F | RESPIRATION RATE: 20 BRPM | WEIGHT: 216 LBS

## 2021-11-01 DIAGNOSIS — R07.0 THROAT PAIN: ICD-10-CM

## 2021-11-01 DIAGNOSIS — K11.7 DROOLING FROM LEFT SIDE OF MOUTH: ICD-10-CM

## 2021-11-01 DIAGNOSIS — R07.89 CHEST PAIN, ATYPICAL: ICD-10-CM

## 2021-11-01 DIAGNOSIS — R05.9 COUGH: ICD-10-CM

## 2021-11-01 DIAGNOSIS — R20.2 FACIAL PARESTHESIA: ICD-10-CM

## 2021-11-01 DIAGNOSIS — I10 ESSENTIAL HYPERTENSION: Primary | ICD-10-CM

## 2021-11-01 PROCEDURE — G8484 FLU IMMUNIZE NO ADMIN: HCPCS | Performed by: FAMILY MEDICINE

## 2021-11-01 PROCEDURE — 1036F TOBACCO NON-USER: CPT | Performed by: FAMILY MEDICINE

## 2021-11-01 PROCEDURE — G8427 DOCREV CUR MEDS BY ELIG CLIN: HCPCS | Performed by: FAMILY MEDICINE

## 2021-11-01 PROCEDURE — 99213 OFFICE O/P EST LOW 20 MIN: CPT | Performed by: FAMILY MEDICINE

## 2021-11-01 PROCEDURE — G8417 CALC BMI ABV UP PARAM F/U: HCPCS | Performed by: FAMILY MEDICINE

## 2021-11-01 PROCEDURE — 3017F COLORECTAL CA SCREEN DOC REV: CPT | Performed by: FAMILY MEDICINE

## 2021-11-01 ASSESSMENT — ENCOUNTER SYMPTOMS
SHORTNESS OF BREATH: 0
DIARRHEA: 0
VOMITING: 0
CONSTIPATION: 0
SORE THROAT: 0
RHINORRHEA: 0
NAUSEA: 0
WHEEZING: 0
ABDOMINAL PAIN: 0

## 2021-11-01 NOTE — PROGRESS NOTES
Social History     Tobacco Use    Smoking status: Former Smoker     Packs/day: 0.50     Years: 15.00     Pack years: 7.50     Types: Cigarettes     Start date: 1975     Quit date: 1990     Years since quittin.8    Smokeless tobacco: Never Used   Substance Use Topics    Alcohol use: Yes     Alcohol/week: 2.0 standard drinks     Types: 2 Cans of beer per week     Comment: occasional           Vitals:    21 1308 21 1324   BP: (!) 174/70 (!) 168/74   Pulse: 68    Resp: 20    Temp: 97.7 °F (36.5 °C)    TempSrc: Temporal    SpO2: 97%    Weight: 215 lb (97.5 kg) 216 lb (98 kg)   Height: 5' 6\" (1.676 m)      Estimated body mass index is 34.86 kg/m² as calculated from the following:    Height as of this encounter: 5' 6\" (1.676 m). Weight as of this encounter: 216 lb (98 kg). Physical Exam  Constitutional:       Appearance: He is well-developed. HENT:      Head: Normocephalic. Eyes:      Extraocular Movements: Extraocular movements intact. Conjunctiva/sclera: Conjunctivae normal.   Cardiovascular:      Rate and Rhythm: Normal rate and regular rhythm. Heart sounds: Normal heart sounds. No murmur heard. Pulmonary:      Effort: Pulmonary effort is normal.      Breath sounds: Normal breath sounds. No wheezing or rales. Abdominal:      General: Bowel sounds are normal.      Palpations: Abdomen is soft. Tenderness: There is no abdominal tenderness. Musculoskeletal:      Right lower leg: No edema. Left lower leg: No edema. Neurological:      General: No focal deficit present. Mental Status: He is alert. Comments: Cranial nerves grossly intact   Psychiatric:         Mood and Affect: Mood normal.         Judgment: Judgment normal.         ASSESSMENT/PLAN:  Robyn Carmona was seen today for hypertension. Diagnoses and all orders for this visit:    Essential hypertension  Blood pressure slightly elevated today. Goal is to keep blood pressure less than 140/90. Patient is tolerating losartan 50 mg daily well without issue. Patient has been unable to check his blood pressure at home. Patient advised to obtain a blood pressure cuff. Patient to keep track of his blood pressure and bring his log to his next appointment. Patient to follow-up in 1 to 2 weeks. Patient return sooner with any new or worsening issues. Chest pain, atypical  Resolved per patient's report. Facial paresthesia  Drooling from left side of mouth  Improving. Continue to follow. Patient follow-up with ENT. Cough  Throat pain  Patient scheduled to see ENT. Return in about 1 week (around 11/8/2021). An [employer]ature was used to authenticate this note.     --Gladys Rowland MD on 11/1/21 at 10:49 AM EDT

## 2021-11-24 DIAGNOSIS — I10 ESSENTIAL HYPERTENSION: ICD-10-CM

## 2021-11-24 RX ORDER — LOSARTAN POTASSIUM 50 MG/1
50 TABLET ORAL DAILY
Qty: 90 TABLET | Refills: 0 | Status: SHIPPED
Start: 2021-11-24 | End: 2022-02-21 | Stop reason: SDUPTHER

## 2022-02-21 DIAGNOSIS — I10 ESSENTIAL HYPERTENSION: ICD-10-CM

## 2022-02-21 RX ORDER — LOSARTAN POTASSIUM 50 MG/1
50 TABLET ORAL DAILY
Qty: 90 TABLET | Refills: 0 | Status: SHIPPED
Start: 2022-02-21 | End: 2022-04-13 | Stop reason: SDUPTHER

## 2022-03-15 ENCOUNTER — TELEPHONE (OUTPATIENT)
Dept: ENT CLINIC | Age: 62
End: 2022-03-15

## 2022-03-15 ENCOUNTER — OFFICE VISIT (OUTPATIENT)
Dept: ENT CLINIC | Age: 62
End: 2022-03-15
Payer: MEDICAID

## 2022-03-15 VITALS
WEIGHT: 217 LBS | HEART RATE: 55 BPM | BODY MASS INDEX: 34.87 KG/M2 | OXYGEN SATURATION: 97 % | TEMPERATURE: 97.7 F | HEIGHT: 66 IN | SYSTOLIC BLOOD PRESSURE: 167 MMHG | DIASTOLIC BLOOD PRESSURE: 89 MMHG

## 2022-03-15 DIAGNOSIS — K21.9 LARYNGOPHARYNGEAL REFLUX (LPR): Primary | ICD-10-CM

## 2022-03-15 DIAGNOSIS — R09.89 GLOBUS SENSATION: ICD-10-CM

## 2022-03-15 PROCEDURE — G8427 DOCREV CUR MEDS BY ELIG CLIN: HCPCS | Performed by: OTOLARYNGOLOGY

## 2022-03-15 PROCEDURE — 1036F TOBACCO NON-USER: CPT | Performed by: OTOLARYNGOLOGY

## 2022-03-15 PROCEDURE — G8484 FLU IMMUNIZE NO ADMIN: HCPCS | Performed by: OTOLARYNGOLOGY

## 2022-03-15 PROCEDURE — 99204 OFFICE O/P NEW MOD 45 MIN: CPT | Performed by: OTOLARYNGOLOGY

## 2022-03-15 PROCEDURE — G8417 CALC BMI ABV UP PARAM F/U: HCPCS | Performed by: OTOLARYNGOLOGY

## 2022-03-15 PROCEDURE — 3017F COLORECTAL CA SCREEN DOC REV: CPT | Performed by: OTOLARYNGOLOGY

## 2022-03-15 RX ORDER — OMEPRAZOLE 40 MG/1
40 CAPSULE, DELAYED RELEASE ORAL
Qty: 90 CAPSULE | Refills: 1 | Status: SHIPPED
Start: 2022-03-15 | End: 2022-05-25

## 2022-03-15 ASSESSMENT — ENCOUNTER SYMPTOMS
VOMITING: 0
COLOR CHANGE: 0
SHORTNESS OF BREATH: 0
EYE REDNESS: 0
COUGH: 0
STRIDOR: 0
EYE DISCHARGE: 0
NAUSEA: 0
ALLERGIC/IMMUNOLOGIC NEGATIVE: 1

## 2022-03-15 NOTE — PROGRESS NOTES
Subjective:     Patient ID:  Abimbola Galvan is a 57 y.o. male. HPI:    Pt presents for problems with the throat. Thepatient feels something in their throat and it bothers them every time they swallow. The patient is hoarse: No        Symptoms of gastroesophageal reflux isnoted. GERD  Paitent complains of heartburn. This has been associatedwith belching, chest pain and nocturnal burning. Queen Julieta been present for several months. He denies dysphagia. He has not lost weight. He denies melena, hematochezia, hematemesis, and coffee ground emesis. Medical therapyin the past has included none. Symptoms of allergic rhinitisis not noted. Smokinghistory: non-smoker  ETOH: Rarely    Patient's medications, allergies, past medical,surgical, social and family histories were reviewed and updated as appropriate. Review of Systems   Constitutional: Negative for chills, fatigue and fever. HENT:        Globus sensation    Eyes: Negative for discharge and redness. Respiratory: Negative for cough, shortness of breath and stridor. Gastrointestinal: Negative for nausea and vomiting. Endocrine: Negative. Genitourinary: Negative. Musculoskeletal: Negative. Skin: Negative for color change and rash. Allergic/Immunologic: Negative. Neurological: Negative for dizziness, speech difficulty and headaches. Hematological: Negative. Psychiatric/Behavioral: Negative for agitation and confusion. All other systems reviewed and are negative. Objective:   Physical Exam  Constitutional:       Appearance: Normal appearance. HENT:      Head: Normocephalic and atraumatic. Right Ear: Tympanic membrane, ear canal and external ear normal.      Left Ear: Tympanic membrane, ear canal and external ear normal.      Mouth/Throat:      Mouth: Mucous membranes are moist.   Eyes:      Pupils: Pupils are equal, round, and reactive to light. Cardiovascular:      Rate and Rhythm: Normal rate. Pulmonary:      Effort: Pulmonary effort is normal.   Musculoskeletal:      Cervical back: Normal range of motion. Neurological:      General: No focal deficit present. Mental Status: He is alert and oriented to person, place, and time. Assessment:       Diagnosis Orders   1. Laryngopharyngeal reflux (LPR)     2. Globus sensation               Plan:     LPR  Recommend dietary/lifestyle modifications, discussed   Recommend PPI  Followup in 4 week(s)                         Sarah Lima  1960    I have discussed the case, including pertinent history and exam findings with the resident. I have seen and examined the patient and the key elements of the encounter have been performed by me. I agree with the assessment, plan and orders as documented by the  resident              Remainder of medical problems as per  resident note. Patient seen and examined. Agree with above exam, assessment and plan.       Electronically signed by Lynette Roy DO on 3/22/22 at 1:12 PM EDT

## 2022-03-15 NOTE — TELEPHONE ENCOUNTER
Dr Obregon Prude office calling to notify you of Pts BP in office today. Readings are in his office note in epic.  Pt was ASX

## 2022-03-23 ENCOUNTER — OFFICE VISIT (OUTPATIENT)
Dept: PRIMARY CARE CLINIC | Age: 62
End: 2022-03-23
Payer: MEDICAID

## 2022-03-23 VITALS
HEART RATE: 64 BPM | RESPIRATION RATE: 20 BRPM | HEIGHT: 66 IN | TEMPERATURE: 97.7 F | SYSTOLIC BLOOD PRESSURE: 154 MMHG | OXYGEN SATURATION: 97 % | DIASTOLIC BLOOD PRESSURE: 66 MMHG | BODY MASS INDEX: 34.23 KG/M2 | WEIGHT: 213 LBS

## 2022-03-23 DIAGNOSIS — R07.0 THROAT PAIN: ICD-10-CM

## 2022-03-23 DIAGNOSIS — I10 ESSENTIAL HYPERTENSION: Primary | ICD-10-CM

## 2022-03-23 DIAGNOSIS — R10.11 RIGHT UPPER QUADRANT ABDOMINAL PAIN: ICD-10-CM

## 2022-03-23 DIAGNOSIS — R05.9 COUGH: ICD-10-CM

## 2022-03-23 DIAGNOSIS — R00.2 PALPITATIONS: ICD-10-CM

## 2022-03-23 PROCEDURE — G8417 CALC BMI ABV UP PARAM F/U: HCPCS | Performed by: FAMILY MEDICINE

## 2022-03-23 PROCEDURE — G8427 DOCREV CUR MEDS BY ELIG CLIN: HCPCS | Performed by: FAMILY MEDICINE

## 2022-03-23 PROCEDURE — 3017F COLORECTAL CA SCREEN DOC REV: CPT | Performed by: FAMILY MEDICINE

## 2022-03-23 PROCEDURE — G8484 FLU IMMUNIZE NO ADMIN: HCPCS | Performed by: FAMILY MEDICINE

## 2022-03-23 PROCEDURE — 1036F TOBACCO NON-USER: CPT | Performed by: FAMILY MEDICINE

## 2022-03-23 PROCEDURE — 99214 OFFICE O/P EST MOD 30 MIN: CPT | Performed by: FAMILY MEDICINE

## 2022-03-23 ASSESSMENT — PATIENT HEALTH QUESTIONNAIRE - PHQ9
SUM OF ALL RESPONSES TO PHQ QUESTIONS 1-9: 0
1. LITTLE INTEREST OR PLEASURE IN DOING THINGS: 0
SUM OF ALL RESPONSES TO PHQ QUESTIONS 1-9: 0
SUM OF ALL RESPONSES TO PHQ9 QUESTIONS 1 & 2: 0
2. FEELING DOWN, DEPRESSED OR HOPELESS: 0
SUM OF ALL RESPONSES TO PHQ QUESTIONS 1-9: 0
SUM OF ALL RESPONSES TO PHQ QUESTIONS 1-9: 0

## 2022-03-23 ASSESSMENT — ENCOUNTER SYMPTOMS
VOMITING: 0
SORE THROAT: 0
DIARRHEA: 0
RHINORRHEA: 0
WHEEZING: 0
SHORTNESS OF BREATH: 0
ABDOMINAL PAIN: 1
CONSTIPATION: 0
NAUSEA: 0

## 2022-03-23 NOTE — PROGRESS NOTES
3/23/2022     Chief Complaint   Patient presents with    Hypertension     HPI  Isaura Brooks (:  1960) is a 57 y.o. male, here for evaluation of the following medical concerns:    Throat fullness/Cough: Referred ENT for additional work-up. Placed on PPI for reflux. Hypertension: On losartan 50 mg daily. Goal BP <140/90. In general patient feels well. BP elevated today. Asymptomatic. RUQ and R Flank Pain. 3 months duration. Constant. Randomly worse. Palpations. Ongoing for several years. EKG from pervious are sinus. No holter in the past.     Review of Systems   Constitutional: Negative for chills and fever. HENT: Negative for congestion, rhinorrhea and sore throat. Respiratory: Negative for shortness of breath and wheezing. Cardiovascular: Positive for palpitations. Negative for chest pain and leg swelling. Gastrointestinal: Positive for abdominal pain. Negative for constipation, diarrhea, nausea and vomiting. Skin: Negative for rash. Neurological: Negative for light-headedness and headaches. Past Medical History:   Diagnosis Date    Anxiety     Herpes     Hypertension     Nephrolithiasis        Prior to Visit Medications    Medication Sig Taking?  Authorizing Provider   omeprazole (PRILOSEC) 40 MG delayed release capsule Take 1 capsule by mouth every morning (before breakfast) Yes Edmundo Ontiveros DO   losartan (COZAAR) 50 MG tablet Take 1 tablet by mouth daily Yes Clyde Pinzon MD   albuterol sulfate HFA (VENTOLIN HFA) 108 (90 Base) MCG/ACT inhaler Inhale 2 puffs into the lungs 4 times daily as needed for Wheezing Yes Clyde Pinzon MD        Allergies   Allergen Reactions    Codeine Other (See Comments)     irritability       Social History     Tobacco Use    Smoking status: Former Smoker     Packs/day: 0.50     Years: 15.00     Pack years: 7.50     Types: Cigarettes     Start date: 1975     Quit date: 1990     Years since quittin.2    Smokeless tobacco: Never Used   Substance Use Topics    Alcohol use: Yes     Alcohol/week: 2.0 standard drinks     Types: 2 Cans of beer per week     Comment: occasional           Vitals:    03/23/22 0937   BP: (!) 154/66   Pulse: 64   Resp: 20   Temp: 97.7 °F (36.5 °C)   TempSrc: Temporal   SpO2: 97%   Weight: 213 lb (96.6 kg)   Height: 5' 6\" (1.676 m)     Estimated body mass index is 34.38 kg/m² as calculated from the following:    Height as of this encounter: 5' 6\" (1.676 m). Weight as of this encounter: 213 lb (96.6 kg). Physical Exam  Constitutional:       Appearance: He is well-developed. HENT:      Head: Normocephalic. Eyes:      Extraocular Movements: Extraocular movements intact. Conjunctiva/sclera: Conjunctivae normal.   Cardiovascular:      Rate and Rhythm: Normal rate and regular rhythm. Heart sounds: Normal heart sounds. No murmur heard. Pulmonary:      Effort: Pulmonary effort is normal.      Breath sounds: Normal breath sounds. No wheezing or rales. Abdominal:      General: Bowel sounds are normal.      Palpations: Abdomen is soft. Tenderness: There is no abdominal tenderness. There is no right CVA tenderness or left CVA tenderness. Neurological:      General: No focal deficit present. Mental Status: He is alert. Comments: Cranial nerves grossly intact   Psychiatric:         Mood and Affect: Mood normal.         Judgment: Judgment normal.         ASSESSMENT/PLAN:  Syed Ramirez was seen today for hypertension. Diagnoses and all orders for this visit:    Essential hypertension  -     Holter Monitor 48 Hour; Future  Pressure recently has been elevated above his goal of 140/90. We will increase the patient's losartan to 75 mg daily. Patient call in 1 week to notify us of his blood pressure readings. Goals get blood pressure less than 140/90. May need to increase or add additional medications. Cough/Throat pain  GERD. On PPI. Following w/ ENT.      Right upper quadrant abdominal pain  -     CT ABDOMEN PELVIS WO CONTRAST Additional Contrast? None; Future  3-month duration of right upper quadrant abdominal pain and right flank pain. Pain waxes and wanes. Mostly dull/achy. Will check CT scan. Palpitations  -     Holter Monitor 48 Hour; Future  Reports daily palpitations for several years mainly in the morning. Reports breathing exercises help with this issue and resolve his tachycardia. Will check Holter monitor. Return in about 2 weeks (around 4/6/2022). An electronicsignature was used to authenticate this note.     --Tahira Finch MD on 3/23/22 at 8:08 AM EDT

## 2022-04-07 ENCOUNTER — HOSPITAL ENCOUNTER (OUTPATIENT)
Dept: SLEEP CENTER | Age: 62
Discharge: HOME | End: 2022-04-07
Payer: MEDICAID

## 2022-04-07 DIAGNOSIS — I10 ESSENTIAL HYPERTENSION: ICD-10-CM

## 2022-04-07 DIAGNOSIS — R00.2 PALPITATIONS: ICD-10-CM

## 2022-04-07 PROCEDURE — 93225 XTRNL ECG REC<48 HRS REC: CPT

## 2022-04-07 PROCEDURE — 93226 XTRNL ECG REC<48 HR SCAN A/R: CPT

## 2022-04-13 ENCOUNTER — OFFICE VISIT (OUTPATIENT)
Dept: PRIMARY CARE CLINIC | Age: 62
End: 2022-04-13
Payer: MEDICAID

## 2022-04-13 VITALS
BODY MASS INDEX: 34.07 KG/M2 | SYSTOLIC BLOOD PRESSURE: 148 MMHG | HEART RATE: 61 BPM | HEIGHT: 66 IN | DIASTOLIC BLOOD PRESSURE: 70 MMHG | OXYGEN SATURATION: 94 % | WEIGHT: 212 LBS | TEMPERATURE: 97.2 F

## 2022-04-13 DIAGNOSIS — R10.11 RIGHT UPPER QUADRANT ABDOMINAL PAIN: ICD-10-CM

## 2022-04-13 DIAGNOSIS — R00.2 PALPITATIONS: ICD-10-CM

## 2022-04-13 DIAGNOSIS — I10 ESSENTIAL HYPERTENSION: Primary | ICD-10-CM

## 2022-04-13 PROCEDURE — G8417 CALC BMI ABV UP PARAM F/U: HCPCS | Performed by: FAMILY MEDICINE

## 2022-04-13 PROCEDURE — 1036F TOBACCO NON-USER: CPT | Performed by: FAMILY MEDICINE

## 2022-04-13 PROCEDURE — G8428 CUR MEDS NOT DOCUMENT: HCPCS | Performed by: FAMILY MEDICINE

## 2022-04-13 PROCEDURE — 99214 OFFICE O/P EST MOD 30 MIN: CPT | Performed by: FAMILY MEDICINE

## 2022-04-13 PROCEDURE — 3017F COLORECTAL CA SCREEN DOC REV: CPT | Performed by: FAMILY MEDICINE

## 2022-04-13 RX ORDER — LOSARTAN POTASSIUM 50 MG/1
100 TABLET ORAL DAILY
Qty: 180 TABLET | Refills: 0 | Status: SHIPPED
Start: 2022-04-13 | End: 2022-05-25 | Stop reason: SDUPTHER

## 2022-04-13 RX ORDER — LOSARTAN POTASSIUM 50 MG/1
100 TABLET ORAL DAILY
Qty: 180 TABLET | Refills: 0 | Status: SHIPPED
Start: 2022-04-13 | End: 2022-04-13

## 2022-04-13 ASSESSMENT — ENCOUNTER SYMPTOMS
ABDOMINAL PAIN: 1
NAUSEA: 0
SORE THROAT: 0
RHINORRHEA: 0
CONSTIPATION: 0
WHEEZING: 0
VOMITING: 0
DIARRHEA: 0
SHORTNESS OF BREATH: 0

## 2022-04-13 NOTE — PROGRESS NOTES
2022     Chief Complaint   Patient presents with    Palpitations     have eased up some     Hypertension     has been taking 1.5 tablets of the losartan- he is going to need a refill      HPI  Rivera Herrera (:  1960) is a 57 y.o. male, here for evaluation of the following medical concerns:    Patient presents today to follow-up his blood pressure, abdominal pain, and palpitations. CT of abdomen and pelvis is still pending. Holter monitor was completed and was essentially normal besides 5-6 beats of proximal atrial tachycardia. Patient blood pressure medication was increased to losartan 75 mg daily at his last appointment due to elevated blood pressure. Patient reports persistent pain. Patient does have a CT scan scheduled. No new or worsening pain. Blood pressure slightly improved since his last appointment. Patient has not been checking his blood pressure at home. Patient reports that his sensation of palpitations has improved. Review of Systems   Constitutional: Negative for chills and fever. HENT: Negative for congestion, rhinorrhea and sore throat. Respiratory: Negative for shortness of breath and wheezing. Cardiovascular: Positive for palpitations. Negative for chest pain and leg swelling. Gastrointestinal: Positive for abdominal pain. Negative for constipation, diarrhea, nausea and vomiting. Skin: Negative for rash. Neurological: Negative for light-headedness and headaches. Past Medical History:   Diagnosis Date    Anxiety     Herpes     Hypertension     Nephrolithiasis        Prior to Visit Medications    Medication Sig Taking?  Authorizing Provider   losartan (COZAAR) 50 MG tablet Take 2 tablets by mouth daily Yes Josué Sandra MD   omeprazole (PRILOSEC) 40 MG delayed release capsule Take 1 capsule by mouth every morning (before breakfast) Yes Kimberley Rogers DO   albuterol sulfate HFA (VENTOLIN HFA) 108 (90 Base) MCG/ACT inhaler Inhale 2 puffs into hypertension. Diagnoses and all orders for this visit:    Essential hypertension  -     Discontinue: losartan (COZAAR) 50 MG tablet; Take 2 tablets by mouth daily Taking 1.5 tablets daily  -     losartan (COZAAR) 50 MG tablet; Take 2 tablets by mouth daily  Patient is to continue to check his blood pressure at home. If his blood pressure is above 140/90 patient will increase from losartan 75 mg daily up to 100 mg daily. Right upper quadrant abdominal pain  Patient to complete CT scan of abdomen/pelvis. Right upper quadrant abdominal pain and right-sided flank pain at times. Palpitations  Holter monitor reviewed. No additional work-up or management necessary. GERD has improved with diet changes. Patient only occasionally taking omeprazole. Return in about 6 weeks (around 5/25/2022). An electronicsignature was used to authenticate this note.     --Erik Fernandes MD on 4/13/22 at 1:25 PM EDT

## 2022-05-02 ENCOUNTER — HOSPITAL ENCOUNTER (OUTPATIENT)
Dept: CT IMAGING | Age: 62
Discharge: HOME | End: 2022-05-04
Payer: MEDICAID

## 2022-05-02 DIAGNOSIS — R10.11 RIGHT UPPER QUADRANT ABDOMINAL PAIN: ICD-10-CM

## 2022-05-02 PROCEDURE — 74176 CT ABD & PELVIS W/O CONTRAST: CPT

## 2022-05-25 ENCOUNTER — OFFICE VISIT (OUTPATIENT)
Dept: PRIMARY CARE CLINIC | Age: 62
End: 2022-05-25
Payer: MEDICAID

## 2022-05-25 VITALS
OXYGEN SATURATION: 98 % | WEIGHT: 212.5 LBS | TEMPERATURE: 97.7 F | HEIGHT: 66 IN | BODY MASS INDEX: 34.15 KG/M2 | SYSTOLIC BLOOD PRESSURE: 134 MMHG | HEART RATE: 58 BPM | DIASTOLIC BLOOD PRESSURE: 68 MMHG

## 2022-05-25 DIAGNOSIS — M51.35 DDD (DEGENERATIVE DISC DISEASE), THORACOLUMBAR: ICD-10-CM

## 2022-05-25 DIAGNOSIS — R10.11 RIGHT UPPER QUADRANT ABDOMINAL PAIN: ICD-10-CM

## 2022-05-25 DIAGNOSIS — I10 ESSENTIAL HYPERTENSION: Primary | ICD-10-CM

## 2022-05-25 DIAGNOSIS — R00.2 PALPITATIONS: ICD-10-CM

## 2022-05-25 DIAGNOSIS — E78.1 HYPERTRIGLYCERIDEMIA: ICD-10-CM

## 2022-05-25 DIAGNOSIS — K57.90 DIVERTICULOSIS: ICD-10-CM

## 2022-05-25 PROCEDURE — 99214 OFFICE O/P EST MOD 30 MIN: CPT | Performed by: FAMILY MEDICINE

## 2022-05-25 PROCEDURE — G8427 DOCREV CUR MEDS BY ELIG CLIN: HCPCS | Performed by: FAMILY MEDICINE

## 2022-05-25 PROCEDURE — G8417 CALC BMI ABV UP PARAM F/U: HCPCS | Performed by: FAMILY MEDICINE

## 2022-05-25 PROCEDURE — 3017F COLORECTAL CA SCREEN DOC REV: CPT | Performed by: FAMILY MEDICINE

## 2022-05-25 PROCEDURE — 1036F TOBACCO NON-USER: CPT | Performed by: FAMILY MEDICINE

## 2022-05-25 RX ORDER — LOSARTAN POTASSIUM 50 MG/1
100 TABLET ORAL DAILY
Qty: 180 TABLET | Refills: 1 | Status: SHIPPED | OUTPATIENT
Start: 2022-05-25 | End: 2025-10-31

## 2022-05-25 ASSESSMENT — ENCOUNTER SYMPTOMS
VOMITING: 0
WHEEZING: 0
SHORTNESS OF BREATH: 0
SORE THROAT: 0
CONSTIPATION: 0
RHINORRHEA: 0
ABDOMINAL PAIN: 0
DIARRHEA: 0
NAUSEA: 0

## 2022-05-25 NOTE — PROGRESS NOTES
2022     Chief Complaint   Patient presents with    Hypertension     HPI  Joshua Cross (:  1960) is a 57 y.o. male, here for evaluation of the following medical concerns:    Patient presents today for follow-up in regards to his blood pressure and right upper quadrant abdominal pain. Patient had his last office appointment was advised to start taking losartan 100 mg daily. Goal is to get the patient's blood pressure less than 140/90. Holter monitor in the past was noted. No additional work-up in regards to the patient's reported palpitations as necessary. Patient's GERD has remained stable with the occasional use of omeprazole. Pressure today is stable below his goal of 140/90. Tolerating medication well without issue. CT Scan      Impression   A punctate nonobstructing stone in the left kidney.       Distal colonic diverticulosis.  No acute diverticulitis.       Mildly enlarged prostate.  Correlation with rectal examination and serum PSA   levels is recommended.           Review of Systems   Constitutional: Positive for fatigue. Negative for chills and fever. HENT: Negative for congestion, rhinorrhea and sore throat. Respiratory: Negative for shortness of breath and wheezing. Cardiovascular: Negative for chest pain and leg swelling. Gastrointestinal: Negative for abdominal pain, constipation, diarrhea, nausea and vomiting. Skin: Negative for rash. Neurological: Negative for light-headedness and headaches. Past Medical History:   Diagnosis Date    Anxiety     Herpes     Hypertension     Nephrolithiasis        Prior to Visit Medications    Medication Sig Taking?  Authorizing Provider   losartan (COZAAR) 50 mg tablet Take 2 tablets by mouth daily Yes Fer Leonardo MD        Allergies   Allergen Reactions    Codeine Other (See Comments)     irritability       Social History     Tobacco Use    Smoking status: Former Smoker     Packs/day: 0.50     Years: 15.00 Pack years: 7.50     Types: Cigarettes     Start date: 1975     Quit date: 1990     Years since quittin.4    Smokeless tobacco: Never Used   Substance Use Topics    Alcohol use: Yes     Alcohol/week: 2.0 standard drinks     Types: 2 Cans of beer per week     Comment: occasional           Vitals:    22 1529   BP: 134/68   Pulse: 58   Temp: 97.7 °F (36.5 °C)   SpO2: 98%   Weight: 212 lb 8 oz (96.4 kg)   Height: 5' 6\" (1.676 m)     Estimated body mass index is 34.3 kg/m² as calculated from the following:    Height as of this encounter: 5' 6\" (1.676 m). Weight as of this encounter: 212 lb 8 oz (96.4 kg). Physical Exam  Constitutional:       Appearance: He is well-developed. HENT:      Head: Normocephalic. Eyes:      Extraocular Movements: Extraocular movements intact. Conjunctiva/sclera: Conjunctivae normal.   Cardiovascular:      Rate and Rhythm: Normal rate and regular rhythm. Heart sounds: Normal heart sounds. No murmur heard. Pulmonary:      Effort: Pulmonary effort is normal.      Breath sounds: Normal breath sounds. No wheezing or rales. Abdominal:      General: Bowel sounds are normal.      Palpations: Abdomen is soft. Tenderness: There is no abdominal tenderness. Musculoskeletal:      Right lower leg: No edema. Left lower leg: No edema. Neurological:      General: No focal deficit present. Mental Status: He is alert. Comments: Cranial nerves grossly intact   Psychiatric:         Mood and Affect: Mood normal.         Judgment: Judgment normal.         ASSESSMENT/PLAN:  Una Ferguson was seen today for hypertension. Diagnoses and all orders for this visit:    Essential hypertension  -     losartan (COZAAR) 50 mg tablet; Take 2 tablets by mouth daily  -     CBC with Auto Differential; Future  -     Comprehensive Metabolic Panel; Future  -     Microalbumin / Creatinine Urine Ratio;  Future  Blood pressure is well controlled today below his goal of 140/90. Tolerating losartan 100 mg daily well. Requesting refill. Patient to call with any issues with this medication. Patient to check his blood pressure a few times weekly. Return to clinic with elevated blood pressures. Right upper quadrant abdominal pain  As noted below. Referral to physical therapy. Palpitations  Occasional.  Rare. No worsening. Work-up essentially negative. Diverticulosis  No recent exacerbations or flares. Continue to follow. DDD (degenerative disc disease), thoracolumbar  -     External Referral To Physical Therapy  In the patient's persistent middle back pain with radicular symptoms around his rib cage we will refer him to physical therapy. Hypertriglyceridemia  -     Lipid Panel; Future  Modifications advisable. Patient complete labs within the next 1 to 2 weeks. Will call with results. Follow-up in 4 months. Return in about 4 months (around 9/25/2022) for Routine Follow-up of Chronic Conditions. An [employer]ature was used to authenticate this note.     --Reyes Ruiz MD on 5/25/22 at 2:00 PM EDT

## 2022-08-31 ENCOUNTER — OFFICE VISIT (OUTPATIENT)
Dept: ENT CLINIC | Age: 62
End: 2022-08-31
Payer: MEDICAID

## 2022-08-31 VITALS — HEIGHT: 66 IN | BODY MASS INDEX: 34.55 KG/M2 | WEIGHT: 215 LBS

## 2022-08-31 DIAGNOSIS — K21.9 LARYNGOPHARYNGEAL REFLUX (LPR): Primary | ICD-10-CM

## 2022-08-31 DIAGNOSIS — R09.89 GLOBUS SENSATION: ICD-10-CM

## 2022-08-31 PROCEDURE — G8427 DOCREV CUR MEDS BY ELIG CLIN: HCPCS | Performed by: OTOLARYNGOLOGY

## 2022-08-31 PROCEDURE — 99213 OFFICE O/P EST LOW 20 MIN: CPT | Performed by: OTOLARYNGOLOGY

## 2022-08-31 PROCEDURE — G8417 CALC BMI ABV UP PARAM F/U: HCPCS | Performed by: OTOLARYNGOLOGY

## 2022-08-31 PROCEDURE — 3017F COLORECTAL CA SCREEN DOC REV: CPT | Performed by: OTOLARYNGOLOGY

## 2022-08-31 PROCEDURE — 1036F TOBACCO NON-USER: CPT | Performed by: OTOLARYNGOLOGY

## 2022-08-31 ASSESSMENT — ENCOUNTER SYMPTOMS
ALLERGIC/IMMUNOLOGIC NEGATIVE: 1
COLOR CHANGE: 0
EYE DISCHARGE: 0
NAUSEA: 0
SHORTNESS OF BREATH: 0
EYE REDNESS: 0
STRIDOR: 0
COUGH: 0
VOMITING: 0

## 2022-08-31 NOTE — PROGRESS NOTES
Subjective:      Patient ID:  Army Rouse is a 60 y.o. male. HPI:    Patient presents today for reflux. Condition has been present for 2 month(s). Pt feels the reflux is better but is feeling pain in the throat. Pt is not having trouble swallowing food but feels pain on the left side in the evening after dinner. But the pain comes and goes       Past Medical History:   Diagnosis Date    Anxiety     Herpes     Hypertension     Nephrolithiasis      Past Surgical History:   Procedure Laterality Date    COLONOSCOPY      few years ago    FINGER SURGERY Left 31    left hand surgery - tendons     Family History   Problem Relation Age of Onset    Cancer Mother 79        ovarian    Depression Sister     Substance Abuse Brother     No Known Problems Sister     No Known Problems Sister     No Known Problems Sister     No Known Problems Sister     No Known Problems Brother     Alcohol Abuse Son     Substance Abuse Son      Social History     Socioeconomic History    Marital status: Legally      Spouse name: None    Number of children: None    Years of education: None    Highest education level: None   Tobacco Use    Smoking status: Former     Packs/day: 0.50     Years: 15.00     Pack years: 7.50     Types: Cigarettes     Start date: 1975     Quit date: 1990     Years since quittin.6     Passive exposure: Past    Smokeless tobacco: Never   Vaping Use    Vaping Use: Never used   Substance and Sexual Activity    Alcohol use:  Yes     Alcohol/week: 2.0 standard drinks     Types: 2 Cans of beer per week     Comment: occasional    Drug use: No    Sexual activity: Yes     Partners: Female     Social Determinants of Health     Financial Resource Strain: Low Risk     Difficulty of Paying Living Expenses: Not hard at all   Food Insecurity: No Food Insecurity    Worried About Running Out of Food in the Last Year: Never true    Ran Out of Food in the Last Year: Never true     Allergies   Allergen Reactions Codeine Other (See Comments)     irritability       Review of Systems   Constitutional:  Negative for chills, fatigue and fever. HENT:          Globus sensation    Eyes:  Negative for discharge and redness. Respiratory:  Negative for cough, shortness of breath and stridor. Gastrointestinal:  Negative for nausea and vomiting. Endocrine: Negative. Genitourinary: Negative. Musculoskeletal: Negative. Skin:  Negative for color change and rash. Allergic/Immunologic: Negative. Neurological:  Negative for dizziness, speech difficulty and headaches. Hematological: Negative. Psychiatric/Behavioral:  Negative for agitation and confusion. All other systems reviewed and are negative. Objective: There were no vitals filed for this visit. Physical Exam  Constitutional:       Appearance: Normal appearance. HENT:      Head: Normocephalic and atraumatic. Right Ear: Tympanic membrane, ear canal and external ear normal.      Left Ear: Tympanic membrane, ear canal and external ear normal.      Mouth/Throat:      Mouth: Mucous membranes are moist.   Eyes:      Pupils: Pupils are equal, round, and reactive to light. Cardiovascular:      Rate and Rhythm: Normal rate. Pulmonary:      Effort: Pulmonary effort is normal.   Musculoskeletal:      Cervical back: Normal range of motion. Neurological:      General: No focal deficit present. Mental Status: He is alert and oriented to person, place, and time. Assessment:       Diagnosis Orders   1. Laryngopharyngeal reflux (LPR)        2. Globus sensation                   Plan:      The patient is continuing to have pain but has stopped his medicine and is having pain in the morning and when he is laying down. He may not be well controlled. Follow up in 3 months with scope if not better.

## 2022-10-06 ENCOUNTER — OFFICE VISIT (OUTPATIENT)
Dept: PRIMARY CARE CLINIC | Age: 62
End: 2022-10-06
Payer: MEDICAID

## 2022-10-06 VITALS
BODY MASS INDEX: 35.03 KG/M2 | RESPIRATION RATE: 20 BRPM | HEART RATE: 57 BPM | SYSTOLIC BLOOD PRESSURE: 156 MMHG | OXYGEN SATURATION: 96 % | WEIGHT: 218 LBS | TEMPERATURE: 98.6 F | DIASTOLIC BLOOD PRESSURE: 74 MMHG | HEIGHT: 66 IN

## 2022-10-06 DIAGNOSIS — D17.0 LIPOMA OF NECK: ICD-10-CM

## 2022-10-06 DIAGNOSIS — E66.01 CLASS 2 SEVERE OBESITY DUE TO EXCESS CALORIES WITH SERIOUS COMORBIDITY AND BODY MASS INDEX (BMI) OF 35.0 TO 35.9 IN ADULT (HCC): ICD-10-CM

## 2022-10-06 DIAGNOSIS — Z12.11 COLON CANCER SCREENING: ICD-10-CM

## 2022-10-06 DIAGNOSIS — M51.35 DDD (DEGENERATIVE DISC DISEASE), THORACOLUMBAR: ICD-10-CM

## 2022-10-06 DIAGNOSIS — I10 ESSENTIAL HYPERTENSION: Primary | ICD-10-CM

## 2022-10-06 DIAGNOSIS — R00.2 PALPITATIONS: ICD-10-CM

## 2022-10-06 DIAGNOSIS — E78.1 HYPERTRIGLYCERIDEMIA: ICD-10-CM

## 2022-10-06 PROCEDURE — G8484 FLU IMMUNIZE NO ADMIN: HCPCS | Performed by: FAMILY MEDICINE

## 2022-10-06 PROCEDURE — G8427 DOCREV CUR MEDS BY ELIG CLIN: HCPCS | Performed by: FAMILY MEDICINE

## 2022-10-06 PROCEDURE — 3017F COLORECTAL CA SCREEN DOC REV: CPT | Performed by: FAMILY MEDICINE

## 2022-10-06 PROCEDURE — 99214 OFFICE O/P EST MOD 30 MIN: CPT | Performed by: FAMILY MEDICINE

## 2022-10-06 PROCEDURE — 1036F TOBACCO NON-USER: CPT | Performed by: FAMILY MEDICINE

## 2022-10-06 PROCEDURE — G8417 CALC BMI ABV UP PARAM F/U: HCPCS | Performed by: FAMILY MEDICINE

## 2022-10-06 ASSESSMENT — ENCOUNTER SYMPTOMS
ABDOMINAL PAIN: 0
DIARRHEA: 0
RHINORRHEA: 0
WHEEZING: 1
VOMITING: 0
COUGH: 1
NAUSEA: 0
BACK PAIN: 1
CONSTIPATION: 0
SHORTNESS OF BREATH: 0
SORE THROAT: 0

## 2022-10-06 NOTE — PROGRESS NOTES
10/6/2022     Chief Complaint   Patient presents with    Hypertension       HPI  Consuelo Echols (:  1960) is a 60 y.o. male, here for evaluation of the following medical concerns:    Patient is a 21-year-old male with a past medical history of hypertension, GERD/difficulty swallowing, diverticulosis, enlarged prostate, occasional palpitations with negative work-up, degenerative disc disease of the middle and low back, elevated triglycerides who presents today for a routine follow-up and labs. Labs pending. Following with ENT for GERD and difficulty swallowing. Not currently on any acid reducing medications. Patient did not complete a full course of PT but reports he is doing okay with his back pain. Still time does report some right-sided back pain with radiation around his ribs. Patient also reports aches and pains when walking and issues for prolonged period of time. This does not occur at home when he is not wearing his shoes. Patient palpitations blood pressure medications as prescribed. Reports he is only taking 50 mg daily. Blood pressure elevated today. BMI 35. Seasonal allergies: Not currently taking anything for such. Does not bother her much. Since for possible COPD: Uses rescue inhaler rarely at home. Review of Systems   Constitutional:  Negative for chills and fever. HENT:  Positive for congestion. Negative for rhinorrhea and sore throat. Respiratory:  Positive for cough and wheezing. Negative for shortness of breath. Cardiovascular:  Negative for chest pain and leg swelling. Gastrointestinal:  Negative for abdominal pain, constipation, diarrhea, nausea and vomiting. Musculoskeletal:  Positive for arthralgias and back pain. Skin:  Negative for rash. Neurological:  Negative for light-headedness and headaches.      Past Medical History:   Diagnosis Date    Anxiety     Herpes     Hypertension     Nephrolithiasis        Prior to Visit Medications Medication Sig Taking? Authorizing Provider   losartan (COZAAR) 50 mg tablet Take 2 tablets by mouth daily Yes Milly Soriano MD        Allergies   Allergen Reactions    Codeine Other (See Comments)     irritability       Social History     Tobacco Use    Smoking status: Former     Packs/day: 0.50     Years: 15.00     Pack years: 7.50     Types: Cigarettes     Start date: 1975     Quit date: 1990     Years since quittin.7     Passive exposure: Past    Smokeless tobacco: Never   Substance Use Topics    Alcohol use: Yes     Alcohol/week: 2.0 standard drinks     Types: 2 Cans of beer per week     Comment: occasional           Vitals:    10/06/22 1150   BP: (!) 156/74   Pulse: 57   Resp: 20   Temp: 98.6 °F (37 °C)   TempSrc: Temporal   SpO2: 96%   Weight: 218 lb (98.9 kg)   Height: 5' 6\" (1.676 m)     Estimated body mass index is 35.19 kg/m² as calculated from the following:    Height as of this encounter: 5' 6\" (1.676 m). Weight as of this encounter: 218 lb (98.9 kg). Physical Exam  Constitutional:       Appearance: He is well-developed. HENT:      Head: Normocephalic. Mouth/Throat:      Pharynx: Posterior oropharyngeal erythema present. Eyes:      Extraocular Movements: Extraocular movements intact. Conjunctiva/sclera: Conjunctivae normal.   Cardiovascular:      Rate and Rhythm: Normal rate and regular rhythm. Heart sounds: Normal heart sounds. No murmur heard. Pulmonary:      Effort: Pulmonary effort is normal.      Breath sounds: Normal breath sounds. No wheezing or rales. Abdominal:      General: Bowel sounds are normal.      Palpations: Abdomen is soft. Tenderness: There is no abdominal tenderness. Musculoskeletal:      Right lower leg: No edema. Left lower leg: No edema. Neurological:      General: No focal deficit present. Mental Status: He is alert.       Comments: Cranial nerves grossly intact   Psychiatric:         Mood and Affect: Mood normal. Judgment: Judgment normal.       ASSESSMENT/PLAN:  Ramy Else was seen today for hypertension. Diagnoses and all orders for this visit:    Essential hypertension  Patient blood pressure is elevated today above his goal of 140/90. Patient reports he is only been taking 50 mg of losartan daily. Patient is supposed to be taking 50 mg twice daily. Patient is to restart on the appropriate dosage of his losartan. Patient will call us in 2 weeks to notify us of his blood pressure readings. Patient to call sooner with low blood pressure readings. Afterwards patient will complete blood work. Patient to follow-up in 2 months in regards to his medical issues. Colon cancer screening  -     Fecal DNA Colorectal cancer screening ([facility])    Palpitations  Occasional palpitations. Full work-up in the past was essentially negative. Continue to follow. DDD (degenerative disc disease), thoracolumbar  Patient was referred to physical therapy. Consider referral back to physical therapy or to pain management/sports medicine if desired. Moderate symptoms at times. Otherwise stable. Hypertriglyceridemia  Lifestyle modifications reviewed and advised. Update lipid panel. Lipoma of neck  Patient was previously referred to general surgery. Patient missed his office appointment. Patient to follow-up as needed if he is interested in having such removed. Class 2 severe obesity due to excess calories with serious comorbidity and body mass index (BMI) of 35.0 to 35.9 in adult [facility])  Lifestyle modifications reviewed and advised. After patient contact us to notify us how he is doing with in regards to his blood pressure he will complete his labs as follows: CBC, CMP, lipid panel, urine microalbumin. Return in about 2 months (around 12/6/2022) for Follow-up Appointment From Today's Visit. An [email or web address] was used to authenticate this note.     --Enrique Garcias MD on 10/6/22 at 10:12 AM EDT

## 2022-10-11 ENCOUNTER — INITIAL CONSULT (OUTPATIENT)
Dept: GASTROENTEROLOGY | Age: 62
End: 2022-10-11
Payer: MEDICAID

## 2022-10-11 VITALS
OXYGEN SATURATION: 95 % | BODY MASS INDEX: 35.17 KG/M2 | TEMPERATURE: 97.4 F | WEIGHT: 218.8 LBS | HEIGHT: 66 IN | RESPIRATION RATE: 18 BRPM | SYSTOLIC BLOOD PRESSURE: 142 MMHG | HEART RATE: 74 BPM | DIASTOLIC BLOOD PRESSURE: 84 MMHG

## 2022-10-11 DIAGNOSIS — R09.89 GLOBUS SENSATION: Primary | ICD-10-CM

## 2022-10-11 DIAGNOSIS — K21.9 GASTROESOPHAGEAL REFLUX DISEASE WITHOUT ESOPHAGITIS: ICD-10-CM

## 2022-10-11 PROCEDURE — G8427 DOCREV CUR MEDS BY ELIG CLIN: HCPCS | Performed by: NURSE PRACTITIONER

## 2022-10-11 PROCEDURE — 1036F TOBACCO NON-USER: CPT | Performed by: NURSE PRACTITIONER

## 2022-10-11 PROCEDURE — 3017F COLORECTAL CA SCREEN DOC REV: CPT | Performed by: NURSE PRACTITIONER

## 2022-10-11 PROCEDURE — 99202 OFFICE O/P NEW SF 15 MIN: CPT | Performed by: NURSE PRACTITIONER

## 2022-10-11 PROCEDURE — G8484 FLU IMMUNIZE NO ADMIN: HCPCS | Performed by: NURSE PRACTITIONER

## 2022-10-11 PROCEDURE — G8417 CALC BMI ABV UP PARAM F/U: HCPCS | Performed by: NURSE PRACTITIONER

## 2022-10-11 RX ORDER — OMEPRAZOLE 40 MG/1
40 CAPSULE, DELAYED RELEASE ORAL DAILY
Qty: 30 CAPSULE | Refills: 5 | Status: SHIPPED | OUTPATIENT
Start: 2022-10-11

## 2022-10-11 NOTE — PROGRESS NOTES
Dong Grewal (:  1960) is a 60 y.o. male, here for evaluation of the following chief complaint(s):  New Patient (New patient- ref by Dr. Mich Colby for laryngopharyngeal reflux)      SUBJECTIVE/OBJECTIVE:  HPI:    Ny Simpson is a very pleasant 60year old gentleman that presents today with complaints of tightness in his throat that is worse in the mornings. He has been diagnosed with acid reflux  Has occasional heartburn  During the day, he is usually asymptomatic  Worse at night  Patient had been on pantoprazole in the past but he did not feel that it worked well  Not a smoker and no alcohol  No routine NSAID use       ROS:  General: Patient denies n/v/f/c or weight loss. HEENT: Patient denies persistent postnasal drip, scleral icterus, drooling, persistent bleeding from nose/mouth. Resp: Patient denies SOB, wheezing, productive cough. Cards: Patient denies CP, palpitations, significant edema  GI: As above. Derm: Patient denies jaundice/rashes. Musc: Patient denies diffuse/irregular joint swelling or myalgias. Objective   Wt Readings from Last 3 Encounters:   10/11/22 218 lb 12.8 oz (99.2 kg)   10/06/22 218 lb (98.9 kg)   22 215 lb (97.5 kg)     Temp Readings from Last 3 Encounters:   10/11/22 97.4 °F (36.3 °C) (Infrared)   10/06/22 98.6 °F (37 °C) (Temporal)   22 97.7 °F (36.5 °C)     BP Readings from Last 3 Encounters:   10/11/22 (!) 142/84   10/06/22 (!) 156/74   22 134/68     Pulse Readings from Last 3 Encounters:   10/11/22 74   10/06/22 57   22 58        Physical Exam  Constitutional:       Appearance: Normal appearance. Cardiovascular:      Pulses: Normal pulses. Heart sounds: Normal heart sounds. Pulmonary:      Effort: Pulmonary effort is normal.      Breath sounds: Normal breath sounds. Abdominal:      General: Bowel sounds are normal.      Palpations: Abdomen is soft. Neurological:      Mental Status: He is alert.        Past Medical History: Diagnosis Date    Anxiety     Herpes     Hypertension     Nephrolithiasis       Past Surgical History:   Procedure Laterality Date    COLONOSCOPY      few years ago    FINGER SURGERY Left 26    left hand surgery - tendons      Family History   Problem Relation Age of Onset    Cancer Mother 79        ovarian    Depression Sister     Substance Abuse Brother     No Known Problems Sister     No Known Problems Sister     No Known Problems Sister     No Known Problems Sister     No Known Problems Brother     Alcohol Abuse Son     Substance Abuse Son         Lab Results   Component Value Date    WBC 10.6 10/18/2021    HGB 14.2 10/18/2021    HCT 41.9 10/18/2021    MCV 95.7 10/18/2021     10/18/2021      Lab Results   Component Value Date     10/18/2021    K 4.1 10/18/2021     10/18/2021    CO2 25 10/18/2021    BUN 12 10/18/2021    CREATININE 0.9 10/18/2021    GLUCOSE 101 (H) 10/18/2021    CALCIUM 8.9 10/18/2021    PROT 6.9 10/18/2021    LABALBU 4.3 10/18/2021    BILITOT 0.4 10/18/2021    ALKPHOS 74 10/18/2021    AST 22 10/18/2021    ALT 26 10/18/2021    LABGLOM >60 10/18/2021    GFRAA >60 10/18/2021                       ASSESSMENT/PLAN:    1. Globus sensation  2. Gastroesophageal reflux disease without esophagitis    -omeprazole 40 mg prescribed at bedtime. Patient admits that his symptoms are worse when he eats specific foods  -an EGD is advised to rule out gastropathy. Patient is agreeable to proceed. Schedule EGD under MAC anesthesia  Procedure literature given. The risks and alternatives were explained as per ASGE guidelines (I.  E.  Perforation, 3% chance of bleeding, reaction to medication, infection, and death). Return for Follow up after procedure. An electronic signature was used to authenticate this note.     --Kolby Daniels, RADHA - CNP

## 2022-10-12 ENCOUNTER — TELEPHONE (OUTPATIENT)
Dept: GASTROENTEROLOGY | Age: 62
End: 2022-10-12

## 2022-10-12 ENCOUNTER — PREP FOR PROCEDURE (OUTPATIENT)
Dept: GASTROENTEROLOGY | Age: 62
End: 2022-10-12

## 2022-10-12 PROBLEM — K21.9 LARYNGOPHARYNGEAL REFLUX: Status: ACTIVE | Noted: 2022-10-12

## 2022-10-12 NOTE — TELEPHONE ENCOUNTER
Prior Authorization Form:      DEMOGRAPHICS:                     Patient Name:  Alecia Amaya  Patient :  1960            Insurance:  Payor: [facility] / Plan: [employer] [place] / Product Type: *No Product type* /   Insurance ID Number:    Payer/Plan Subscr  Sex Relation Sub.  Ins. ID Effective Group Num   1. [facility] 1960 Male Self [id number]                                    [id number]         DIAGNOSIS & PROCEDURE:                       Procedure/Operation: EGD           CPT Code: 20320    Diagnosis:  Laryngopharyngeal reflux    ICD10 Code: k21.9    Location:  [address] [place] [address]    Surgeon:  Dr. Yaa Felix     SCHEDULING INFORMATION:                          Date: 2022    Time: 1445              Anesthesia:  MAC/TIVA                                                       Status:  Outpatient            Electronically signed by Sherry Thornton MA on 10/12/2022 at 9:01 AM

## 2022-10-15 LAB — NONINV COLON CA DNA+OCC BLD SCRN STL QL: NEGATIVE

## 2022-11-22 NOTE — PROGRESS NOTES
[facility] PRE-ADMISSION TESTING INSTRUCTIONS      ARRIVAL INSTRUCTIONS:  [x] Parking the day of Surgery is located in the Main Entrance lot. Upon entering the main door make an immediate right to the surgery reception desk. [x] Bring photo ID and insurance card    [] Bring in a copy of Living will or Durable Power of  papers. [x] Please be sure to arrange for responsible adult to provide transportation to and from the hospital    [x] Please arrange for responsible adult to be with you for the 24 hour period post procedure due to having anesthesia    [x] If you awake am of surgery not feeling well or have temperature >100 please call [phone]    GENERAL INSTRUCTIONS:    [x] Nothing by mouth after midnight, including gum, candy, mints or water    [x] You may brush your teeth, but do not swallow any water    [x] Take medications as instructed with 1-2 oz of water    [x] Stop herbal supplements and vitamins 5 days prior to procedure    [x] Follow preop dosing of blood thinners per physician instructions    [] Take 1/2 dose of evening insulin, but no insulin after midnight    [] No oral diabetic medications after midnight    [] If diabetic and have low blood sugar or feel symptomatic, take 1-2oz apple juice only    [] Bring inhalers day of surgery    [] Bring C-PAP/ Bi-Pap day of surgery    [] Bring urine specimen day of surgery    [x] Shower or bath with soap, lather and rinse well, AM of Surgery, no lotion, powders or creams to surgical site    [x] Follow bowel prep as instructed per surgeon    [x] No tobacco products within 24 hours of surgery     [x] No alcohol or illegal drug use within 24 hours of surgery.     [x] Jewelry, body piercing's, eyeglasses, contact lenses and dentures are not permitted into surgery (bring cases)      [] Please do not wear any nail polish, make up or hair products on the day of surgery    [x] You can expect a call the business day prior to procedure to notify you if your arrival time changes    [x] If you receive a survey after surgery we would greatly appreciate your comments    [x] Please notify surgeon if you develop any illness between now and time of surgery (cold, cough, sore throat, fever, nausea, vomiting) or any signs of infections  including skin, wounds, and dental.    []  The Outpatient Pharmacy is available to fill your prescription here on your day of surgery, ask your preop nurse for details

## 2022-11-26 ENCOUNTER — ANESTHESIA EVENT (OUTPATIENT)
Dept: ENDOSCOPY | Age: 62
End: 2022-11-26
Payer: MEDICAID

## 2022-11-26 ASSESSMENT — LIFESTYLE VARIABLES: SMOKING_STATUS: 0

## 2022-11-26 NOTE — ANESTHESIA PRE PROCEDURE
Department of Anesthesiology  Preprocedure Note       Name:  Mohini Florentino   Age:  60 y.o.  :  1960                                          MRN:  [id number]         Date:  2022      Surgeon: Stephanie Anne):  Jabari Godinez MD    Procedure: Procedure(s):  EGD ESOPHAGOGASTRODUODENOSCOPY    Medications prior to admission:   Prior to Admission medications    Medication Sig Start Date End Date Taking? Authorizing Provider   omeprazole (PRILOSEC) 40 MG delayed release capsule Take 1 capsule by mouth daily  Patient not taking: Reported on 2022 10/11/22   RADHA Berg - CNP   losartan (COZAAR) 50 mg tablet Take 2 tablets by mouth daily 5/25/22 10/31/25  Tom Will MD       Current medications:    No current facility-administered medications for this encounter. Current Outpatient Medications   Medication Sig Dispense Refill    omeprazole (PRILOSEC) 40 MG delayed release capsule Take 1 capsule by mouth daily (Patient not taking: Reported on 2022) 30 capsule 5    losartan (COZAAR) 50 mg tablet Take 2 tablets by mouth daily 180 tablet 1       Allergies:     Allergies   Allergen Reactions    Codeine Other (See Comments)     irritability       Problem List:    Patient Active Problem List   Diagnosis Code    Essential hypertension I10    Prostatitis N41.9    BPH (benign prostatic hyperplasia) N40.0    Anxiety F41.9    Herpes genitalia A60.00    Genital warts A63.0    Chest pain, atypical R07.89    Right groin hernia K40.90    Diverticulosis K57.90    DDD (degenerative disc disease), cervical M50.30    Laryngopharyngeal reflux K21.9       Past Medical History:        Diagnosis Date    Anxiety     GERD (gastroesophageal reflux disease)     Herpes     Hypertension        Past Surgical History:        Procedure Laterality Date    COLONOSCOPY      few years ago    FINGER SURGERY Left 26    left hand surgery - tendons       Social History:    Social History     Tobacco Use    Smoking status: Former     Packs/day: 0.50     Years: 15.00     Pack years: 7.50     Types: Cigarettes     Start date: 1975     Quit date: 1990     Years since quittin.9     Passive exposure: Past    Smokeless tobacco: Never   Substance Use Topics    Alcohol use: Yes     Alcohol/week: 2.0 standard drinks     Types: 2 Cans of beer per week     Comment: occasional                                Counseling given: Not Answered      Vital Signs (Current):   Vitals:    22 1411   Weight: 215 lb (97.5 kg)   Height: 5' 6\" (1.676 m)                                              BP Readings from Last 3 Encounters:   10/11/22 (!) 142/84   10/06/22 (!) 156/74   22 134/68       NPO Status:                                                                                 BMI:   Wt Readings from Last 3 Encounters:   10/11/22 218 lb 12.8 oz (99.2 kg)   10/06/22 218 lb (98.9 kg)   22 215 lb (97.5 kg)     Body mass index is 34.7 kg/m². CBC:   Lab Results   Component Value Date/Time    WBC 10.6 10/18/2021 04:29 PM    RBC 4.38 10/18/2021 04:29 PM    HGB 14.2 10/18/2021 04:29 PM    HCT 41.9 10/18/2021 04:29 PM    MCV 95.7 10/18/2021 04:29 PM    RDW 12.8 10/18/2021 04:29 PM     10/18/2021 04:29 PM       CMP:   Lab Results   Component Value Date/Time     10/18/2021 04:29 PM    K 4.1 10/18/2021 04:29 PM     10/18/2021 04:29 PM    CO2 25 10/18/2021 04:29 PM    BUN 12 10/18/2021 04:29 PM    CREATININE 0.9 10/18/2021 04:29 PM    GFRAA >60 10/18/2021 04:29 PM    LABGLOM >60 10/18/2021 04:29 PM    GLUCOSE 101 10/18/2021 04:29 PM    PROT 6.9 10/18/2021 04:29 PM    CALCIUM 8.9 10/18/2021 04:29 PM    BILITOT 0.4 10/18/2021 04:29 PM    ALKPHOS 74 10/18/2021 04:29 PM    AST 22 10/18/2021 04:29 PM    ALT 26 10/18/2021 04:29 PM       POC Tests: No results for input(s): POCGLU, POCNA, POCK, POCCL, POCBUN, POCHEMO, POCHCT in the last 72 hours.     Coags: No results found for: PROTIME, INR, APTT    HCG (If Applicable): No results found for: PREGTESTUR, PREGSERUM, HCG, HCGQUANT     ABGs: No results found for: PHART, PO2ART, RMX3BTW, OWF5OEW, BEART, B2JHIRMB     Type & Screen (If Applicable):  No results found for: LABABO, LABRH    Drug/Infectious Status (If Applicable):  No results found for: HIV, HEPCAB    COVID-19 Screening (If Applicable): No results found for: COVID19        Anesthesia Evaluation  Patient summary reviewed no history of anesthetic complications:   Airway: Mallampati: II  TM distance: >3 FB   Neck ROM: full  Mouth opening: > = 3 FB   Dental: normal exam         Pulmonary:Negative Pulmonary ROS breath sounds clear to auscultation      (-) not a current smoker                           Cardiovascular:    (+) hypertension:,       ECG reviewed  Rhythm: regular  Rate: normal                 ROS comment: Chest pain, atypical     Neuro/Psych:   (+) depression/anxiety             GI/Hepatic/Renal:   (+) GERD:,          ROS comment: Laryngopharyngeal reflux (K21.9)  Diverticulosis. Endo/Other:                     Abdominal:   (+) obese,           Vascular: negative vascular ROS. Other Findings:           Anesthesia Plan      MAC     ASA 2       Induction: intravenous. MIPS: Postoperative opioids intended and Prophylactic antiemetics administered. Anesthetic plan and risks discussed with patient. Plan discussed with CRNA. Pt seen, examined, chart reviewed, plan discussed. Edward Land MD  11/28/2022  8:38 AM      Pt. Seen. Agree with above note.   RADHA Shabazz - CRNA

## 2022-11-28 ENCOUNTER — ANESTHESIA (OUTPATIENT)
Dept: ENDOSCOPY | Age: 62
End: 2022-11-28
Payer: MEDICAID

## 2022-11-28 ENCOUNTER — HOSPITAL ENCOUNTER (OUTPATIENT)
Age: 62
Setting detail: OUTPATIENT SURGERY
Discharge: HOME | End: 2022-11-28
Attending: STUDENT IN AN ORGANIZED HEALTH CARE EDUCATION/TRAINING PROGRAM | Admitting: STUDENT IN AN ORGANIZED HEALTH CARE EDUCATION/TRAINING PROGRAM
Payer: MEDICAID

## 2022-11-28 VITALS
SYSTOLIC BLOOD PRESSURE: 138 MMHG | TEMPERATURE: 97.4 F | DIASTOLIC BLOOD PRESSURE: 66 MMHG | RESPIRATION RATE: 16 BRPM | BODY MASS INDEX: 34.55 KG/M2 | WEIGHT: 215 LBS | OXYGEN SATURATION: 97 % | HEART RATE: 60 BPM | HEIGHT: 66 IN

## 2022-11-28 DIAGNOSIS — K21.9 LARYNGOPHARYNGEAL REFLUX: ICD-10-CM

## 2022-11-28 PROCEDURE — 7100000010 HC PHASE II RECOVERY - FIRST 15 MIN: Performed by: STUDENT IN AN ORGANIZED HEALTH CARE EDUCATION/TRAINING PROGRAM

## 2022-11-28 PROCEDURE — 7100000011 HC PHASE II RECOVERY - ADDTL 15 MIN: Performed by: STUDENT IN AN ORGANIZED HEALTH CARE EDUCATION/TRAINING PROGRAM

## 2022-11-28 PROCEDURE — 6360000002 HC RX W HCPCS: Performed by: NURSE ANESTHETIST, CERTIFIED REGISTERED

## 2022-11-28 PROCEDURE — 88342 IMHCHEM/IMCYTCHM 1ST ANTB: CPT

## 2022-11-28 PROCEDURE — 2580000003 HC RX 258: Performed by: NURSE ANESTHETIST, CERTIFIED REGISTERED

## 2022-11-28 PROCEDURE — 2709999900 HC NON-CHARGEABLE SUPPLY: Performed by: STUDENT IN AN ORGANIZED HEALTH CARE EDUCATION/TRAINING PROGRAM

## 2022-11-28 PROCEDURE — 3609012700 HC EGD DILATION SAVORY: Performed by: STUDENT IN AN ORGANIZED HEALTH CARE EDUCATION/TRAINING PROGRAM

## 2022-11-28 PROCEDURE — 88305 TISSUE EXAM BY PATHOLOGIST: CPT

## 2022-11-28 PROCEDURE — 3700000001 HC ADD 15 MINUTES (ANESTHESIA): Performed by: STUDENT IN AN ORGANIZED HEALTH CARE EDUCATION/TRAINING PROGRAM

## 2022-11-28 PROCEDURE — 3700000000 HC ANESTHESIA ATTENDED CARE: Performed by: STUDENT IN AN ORGANIZED HEALTH CARE EDUCATION/TRAINING PROGRAM

## 2022-11-28 RX ORDER — ONDANSETRON 4 MG/1
4 TABLET, ORALLY DISINTEGRATING ORAL EVERY 8 HOURS PRN
Status: CANCELLED | OUTPATIENT
Start: 2022-11-28

## 2022-11-28 RX ORDER — SODIUM CHLORIDE 9 MG/ML
25 INJECTION, SOLUTION INTRAVENOUS PRN
Status: CANCELLED | OUTPATIENT
Start: 2022-11-28

## 2022-11-28 RX ORDER — SODIUM CHLORIDE 0.9 % (FLUSH) 0.9 %
5-40 SYRINGE (ML) INJECTION EVERY 12 HOURS SCHEDULED
Status: CANCELLED | OUTPATIENT
Start: 2022-11-28

## 2022-11-28 RX ORDER — PROPOFOL 10 MG/ML
INJECTION, EMULSION INTRAVENOUS PRN
Status: DISCONTINUED | OUTPATIENT
Start: 2022-11-28 | End: 2022-11-28 | Stop reason: SDUPTHER

## 2022-11-28 RX ORDER — SODIUM CHLORIDE 0.9 % (FLUSH) 0.9 %
5-40 SYRINGE (ML) INJECTION PRN
Status: CANCELLED | OUTPATIENT
Start: 2022-11-28

## 2022-11-28 RX ORDER — SODIUM CHLORIDE 9 MG/ML
INJECTION, SOLUTION INTRAVENOUS CONTINUOUS PRN
Status: DISCONTINUED | OUTPATIENT
Start: 2022-11-28 | End: 2022-11-28 | Stop reason: SDUPTHER

## 2022-11-28 RX ORDER — SODIUM CHLORIDE 9 MG/ML
INJECTION, SOLUTION INTRAVENOUS PRN
Status: CANCELLED | OUTPATIENT
Start: 2022-11-28

## 2022-11-28 RX ORDER — ONDANSETRON 2 MG/ML
4 INJECTION INTRAMUSCULAR; INTRAVENOUS EVERY 6 HOURS PRN
Status: CANCELLED | OUTPATIENT
Start: 2022-11-28

## 2022-11-28 RX ADMIN — PROPOFOL 330 MG: 10 INJECTION, EMULSION INTRAVENOUS at 08:46

## 2022-11-28 RX ADMIN — SODIUM CHLORIDE: 9 INJECTION, SOLUTION INTRAVENOUS at 08:43

## 2022-11-28 ASSESSMENT — PAIN - FUNCTIONAL ASSESSMENT: PAIN_FUNCTIONAL_ASSESSMENT: NONE - DENIES PAIN

## 2022-11-28 NOTE — OP NOTE
Operative Note      Patient: Sreekanth Payan  YOB: 1960  MRN: [id number]    Date of Procedure: 11/28/2022    Pre-Op Diagnosis: GERD, Dysphagia     Post-Op Diagnosis: Gastritis       Procedure(s):  EGD BIOPSY  EGD ESOPHAGOGASTRODUODENOSCOPY DILATATION    Surgeon(s):  Lynette Bradford MD    Assistant:   * No surgical staff found *    Anesthesia: Monitor Anesthesia Care    Estimated Blood Loss (mL): Minimal    Complications: None    Specimens:   ID Type Source Tests Collected by Time Destination   A : ANTRUM Tissue Stomach SURGICAL PATHOLOGY Lynette Bradford MD 11/28/2022 9964        Implants:  * No implants in log *      Drains: * No LDAs found *    Findings:     Normal esophagus. Hypertensive tone at the level of the UES with difficult passage of the scope. Mild diffuse gastritis. H pylori biopsies obtained. Normal duodenum. Empiric dilation to 54Fr. Detailed Description of Procedure:   Pre-Anesthesia Assessment:  Prior to the procedure, a history and physical was performed and patient medications and allergies were reviewed. The risks, benefits, complications, treatment options and expected outcomes were discussed with the patient. The possibilities of reaction to medication, pulmonary aspiration, perforation of the gastrointestinal tract, bleeding requiring transfusion or operation, respiratory failure requiring placement on a ventilator, and failure to diagnose a condition or identify polyps/lesions were discussed with the patient. All questions were answered and informed consent was obtained. Patient identification and proposed procedure were verified by the physician, the nurse, the anesthesiologist, the anesthetist, and the technician in the preprocedure area. Please see accompanying documentation. All staff in attendance for the performed procedure act in the role of the Chaperone. After I obtained informed consent, the scope was passed under direct vision.   Throughout the procedure, the patient's blood pressure, pulse, and oxygen saturations were monitored continuously. The gastroscope was introduced through the mouth and advanced to the duodenum. The procedure was performed without difficulty. The patient tolerated the procedure well. EGD:    The mucosa of the esophagus appeared normal.   No stricture, ulceration, or inflammation was appreciated. Difficult passage of the scope was appreciated at the level of the UES with hypertensive tone appreciated. The Z-line was regular and found at 35cm. No hiatal hernia was appreciated. The mucosa of the stomach showed changes of a mild, diffuse gastritis with erythema, edema, and granularity. No erosions ulceration was appreciated in the body, antrum, or pylorus. Biopsies for H. pylori were obtained. The fundus and cardia were carefully inspected in retroflexion and showed the same mild gastritis. The mucosa of the duodenum appeared normal.  No stricture, inflammation, erosion, or ulceration was appreciated. At the conclusion of the procedure, empiric passage dilation was performed. Di Height guidewire was endoscopically placed under endoscopic guidance into the stomach antrum and the scope then carefully withdrawn with careful attention to keep the wire in place. A Savory dilator was then passed over the wire a maximal dilation of 54Fr. The gastroscope was then reinserted following the dilation. Therapeutic mucosal tearing was not appreciated. No complications of bleeding, deep mucosal tearing, or perforation was appreciated. Recommendations:  -The patient will be observed post procedure until all discharge criteria are met. -Patient has a contact number available for emergencies. The signs and symptoms of potential delayed complications were discussed with the patient.    -Return to normal activities tomorrow.     -Written discharge instructions were provided to the patient.    -Await pathology results.  -Clinic appointment scheduled for 12/13.          Electronically signed by Jabari Godinez MD on 11/28/2022 at 9:03 AM

## 2022-11-28 NOTE — ANESTHESIA POSTPROCEDURE EVALUATION
Department of Anesthesiology  Postprocedure Note    Patient: Sandi Swain  MRN: [id number]  YOB: 1960  Date of evaluation: 11/28/2022      Procedure Summary     Date: 11/28/22 Room / Location: [facility] / [facility]    Anesthesia Start: 9720 Anesthesia Stop: 5104    Procedures:       EGD BIOPSY      EGD ESOPHAGOGASTRODUODENOSCOPY DILATATION Diagnosis:       Laryngopharyngeal reflux      (Laryngopharyngeal reflux [K21.9])    Surgeons: Dacia Soriano MD Responsible Provider: Gabbie Finney MD    Anesthesia Type: MAC ASA Status: 2          Anesthesia Type: No value filed.     Annmarie Phase I: Annmarie Score: 10    Annmarie Phase II:        Anesthesia Post Evaluation    Patient location during evaluation: bedside (Endo)  Patient participation: complete - patient participated  Level of consciousness: awake and alert  Airway patency: patent  Nausea & Vomiting: no nausea and no vomiting  Complications: no  Cardiovascular status: hemodynamically stable  Respiratory status: acceptable  Hydration status: stable

## 2022-11-28 NOTE — DISCHARGE INSTRUCTIONS
Recommendations:  -The patient will be observed post procedure until all discharge criteria are met. -Patient has a contact number available for emergencies. The signs and symptoms of potential delayed complications were discussed with the patient.    -Return to normal activities tomorrow. -Written discharge instructions were provided to the patient.    -Await pathology results.  -Clinic appointment scheduled for 12/13.

## 2022-11-28 NOTE — H&P
[facility]   Gastroenterology, Hepatology, &  Advanced Endoscopy    Consult       ASSESSMENT AND PLAN:    62y/M w/ history of GERD who presents for endoscopic evaluation. PLAN:  EGD today        HISTORY OF PRESENT ILLNESS:      Mr. Marika Mcdonald is a 62y/M that presents with complaints of tightness in his throat that is worse in the mornings. He has been diagnosed with acid reflux  Has occasional heartburn  During the day, he is usually asymptomatic  Worse at night  Patient had been on pantoprazole in the past but he did not feel that it worked well  Not a smoker and no alcohol  No routine NSAID use    Past Medical History:        Diagnosis Date    Anxiety     GERD (gastroesophageal reflux disease)     Herpes     Hypertension      Past Surgical History:        Procedure Laterality Date    COLONOSCOPY      few years ago    FINGER SURGERY Left 26    left hand surgery - tendons     Social History:    TOBACCO:   reports that he quit smoking about 32 years ago. His smoking use included cigarettes. He started smoking about 47 years ago. He has a 7.50 pack-year smoking history. He has been exposed to tobacco smoke. He has never used smokeless tobacco.  ETOH:   reports current alcohol use of about 2.0 standard drinks per week. DRUGS:   reports no history of drug use. Family History:       Problem Relation Age of Onset    Cancer Mother 79        ovarian    Depression Sister     Substance Abuse Brother     No Known Problems Sister     No Known Problems Sister     No Known Problems Sister     No Known Problems Sister     No Known Problems Brother     Alcohol Abuse Son     Substance Abuse Son        No current facility-administered medications for this encounter.     Current Outpatient Medications:     omeprazole (PRILOSEC) 40 MG delayed release capsule, Take 1 capsule by mouth daily (Patient not taking: Reported on 11/22/2022), Disp: 30 capsule, Rfl: 5    losartan (COZAAR) 50 mg tablet, Take 2 tablets by mouth daily, Disp: 180 tablet, Rfl: 1     Allergies:  Codeine    ROS:  General: Patient denies n/v/f/c or weight loss. HEENT: Patient denies persistent postnasal drip, scleral icterus, drooling, persistent bleeding from nose/mouth. Resp: Patient denies SOB, wheezing, productive cough. Cards: Patient denies CP, palpitations, significant edema  GI: As above. Derm: Patient denies jaundice/rashes. Musc: Patient denies diffuse/irregular joint swelling or myalgias. PHYSICAL EXAM:  Ht 5' 6\" (1.676 m)   Wt 215 lb (97.5 kg)   BMI 34.70 kg/m²   Physical Exam:  General: Overall well-appearing, NAD  HEENT: PERRLA, EOMI, Anicteric sclera, MMM, no rhinorrhea  Cards: RRR, no LE edema  Resp: Breathing comfortably on room air, good air movement, no use of accessory muscles, no audible wheezing  Abdomen: soft, NT, ND. Extremities: Moves all extremities, no effusions or bruising.   Skin: No rashes or jaundice  Neuro: A&O x 3, CN grossly intact, non-focal exam               Electronically signed by Lee Cruz MD on 11/28/2022 at 5:08 AM

## 2023-01-19 DIAGNOSIS — I10 ESSENTIAL HYPERTENSION: ICD-10-CM

## 2023-01-19 RX ORDER — LOSARTAN POTASSIUM 50 MG/1
100 TABLET ORAL DAILY
Qty: 60 TABLET | Refills: 0 | Status: SHIPPED | OUTPATIENT
Start: 2023-01-19 | End: 2023-04-19

## 2023-01-30 ENCOUNTER — OFFICE VISIT (OUTPATIENT)
Dept: PRIMARY CARE CLINIC | Age: 63
End: 2023-01-30
Payer: MEDICAID

## 2023-01-30 VITALS
OXYGEN SATURATION: 97 % | DIASTOLIC BLOOD PRESSURE: 70 MMHG | SYSTOLIC BLOOD PRESSURE: 156 MMHG | WEIGHT: 220 LBS | HEIGHT: 66 IN | HEART RATE: 61 BPM | RESPIRATION RATE: 20 BRPM | TEMPERATURE: 97.8 F | BODY MASS INDEX: 35.36 KG/M2

## 2023-01-30 DIAGNOSIS — D17.0 LIPOMA OF NECK: ICD-10-CM

## 2023-01-30 DIAGNOSIS — E78.1 HYPERTRIGLYCERIDEMIA: ICD-10-CM

## 2023-01-30 DIAGNOSIS — I10 ESSENTIAL HYPERTENSION: Primary | ICD-10-CM

## 2023-01-30 DIAGNOSIS — R73.01 IMPAIRED FASTING GLUCOSE: ICD-10-CM

## 2023-01-30 PROCEDURE — 3077F SYST BP >= 140 MM HG: CPT | Performed by: FAMILY MEDICINE

## 2023-01-30 PROCEDURE — 3078F DIAST BP <80 MM HG: CPT | Performed by: FAMILY MEDICINE

## 2023-01-30 PROCEDURE — G8484 FLU IMMUNIZE NO ADMIN: HCPCS | Performed by: FAMILY MEDICINE

## 2023-01-30 PROCEDURE — 99214 OFFICE O/P EST MOD 30 MIN: CPT | Performed by: FAMILY MEDICINE

## 2023-01-30 PROCEDURE — 1036F TOBACCO NON-USER: CPT | Performed by: FAMILY MEDICINE

## 2023-01-30 PROCEDURE — G8427 DOCREV CUR MEDS BY ELIG CLIN: HCPCS | Performed by: FAMILY MEDICINE

## 2023-01-30 PROCEDURE — 3017F COLORECTAL CA SCREEN DOC REV: CPT | Performed by: FAMILY MEDICINE

## 2023-01-30 PROCEDURE — G8417 CALC BMI ABV UP PARAM F/U: HCPCS | Performed by: FAMILY MEDICINE

## 2023-01-30 RX ORDER — LOSARTAN POTASSIUM 50 MG/1
100 TABLET ORAL DAILY
Qty: 180 TABLET | Refills: 0 | Status: SHIPPED | OUTPATIENT
Start: 2023-01-30 | End: 2023-04-30

## 2023-01-30 ASSESSMENT — ENCOUNTER SYMPTOMS
SORE THROAT: 0
WHEEZING: 0
CONSTIPATION: 0
RHINORRHEA: 0
DIARRHEA: 0
ABDOMINAL PAIN: 0
SHORTNESS OF BREATH: 0
VOMITING: 0
NAUSEA: 0

## 2023-01-30 NOTE — PROGRESS NOTES
2023     Chief Complaint   Patient presents with    Hypertension     HPI  Alecia Amaya (:  1960) is a 60 y.o. male, here for evaluation of the following medical concerns:    Patient is a 57-year-old male with a past medical history of hypertension, GERD/difficulty swallowing, diverticulosis, enlarged prostate, occasional palpitations with negative work-up, degenerative disc disease of the middle and low back, elevated triglycerides who presents today for a routine follow-up. Labs pending. Following with ENT for GERD and difficulty swallowing. Reports that his symptoms in general are stable. No longer on PPI. Reports that he is able to control such with his diet. Patient did not complete a full course of PT but reports he is doing okay with his back pain. Still time does report some right-sided back pain with radiation around his ribs. Patient palpitations blood pressure medications as prescribed. Reports he is only taking 75 mg daily. Blood pressure elevated today. Headache secondary to possibly blood pressure versus lipoma. Patient had an appointment with general surgery to have the lipoma removed but reports that he missed such. BMI 35. Seasonal allergies     Since for possible COPD: Uses rescue inhaler rarely at home. Review of Systems   Constitutional:  Negative for chills and fever. HENT:  Negative for congestion, rhinorrhea and sore throat. Respiratory:  Negative for shortness of breath and wheezing. Cardiovascular:  Negative for chest pain and leg swelling. Gastrointestinal:  Negative for abdominal pain, constipation, diarrhea, nausea and vomiting. Skin:  Negative for rash. Neurological:  Negative for light-headedness and headaches. Past Medical History:   Diagnosis Date    Anxiety     GERD (gastroesophageal reflux disease)     Herpes     Hypertension        Prior to Visit Medications    Medication Sig Taking?  Authorizing Provider   losartan (COZAAR) 50 MG tablet Take 2 tablets by mouth daily Yes Jaiden Bates MD   ALBUTEROL IN Inhale into the lungs Yes Historical Provider, MD        Allergies   Allergen Reactions    Codeine Other (See Comments)     irritability       Social History     Tobacco Use    Smoking status: Former     Packs/day: 0.50     Years: 15.00     Pack years: 7.50     Types: Cigarettes     Start date: 1975     Quit date: 1990     Years since quittin.1     Passive exposure: Past    Smokeless tobacco: Never   Substance Use Topics    Alcohol use: Yes     Alcohol/week: 2.0 standard drinks     Types: 2 Cans of beer per week     Comment: occasional           Vitals:    23 1525   BP: (!) 156/70   Pulse: 61   Resp: 20   Temp: 97.8 °F (36.6 °C)   TempSrc: Temporal   SpO2: 97%   Weight: 220 lb (99.8 kg)   Height: 5' 6\" (1.676 m)     Estimated body mass index is 35.51 kg/m² as calculated from the following:    Height as of this encounter: 5' 6\" (1.676 m).    Weight as of this encounter: 220 lb (99.8 kg).    Physical Exam  Constitutional:       Appearance: He is well-developed.   HENT:      Head: Normocephalic.   Eyes:      Extraocular Movements: Extraocular movements intact.      Conjunctiva/sclera: Conjunctivae normal.   Cardiovascular:      Rate and Rhythm: Normal rate and regular rhythm.      Heart sounds: Normal heart sounds. No murmur heard.  Pulmonary:      Effort: Pulmonary effort is normal.      Breath sounds: Normal breath sounds. No wheezing or rales.   Abdominal:      General: Bowel sounds are normal.      Palpations: Abdomen is soft.      Tenderness: There is no abdominal tenderness.   Musculoskeletal:      Right lower leg: No edema.      Left lower leg: No edema.   Neurological:      General: No focal deficit present.      Mental Status: He is alert.      Comments: Cranial nerves grossly intact   Psychiatric:         Mood and Affect: Mood normal.         Judgment: Judgment normal.  ASSESSMENT/PLAN:  Blanca Rico was seen today for hypertension. Diagnoses and all orders for this visit:    Essential hypertension  -     CBC with Auto Differential; Future  -     Comprehensive Metabolic Panel; Future  -     Microalbumin / Creatinine Urine Ratio; Future  -     losartan (COZAAR) 50 MG tablet; Take 2 tablets by mouth daily  Uncontrolled. blood pressure elevated. Patient increase his losartan to 100 mg daily. Labs prior to next appointment. Goals keep blood pressure less than 140/90. If patient is unable to achieve such we will add on amlodipine 2.5 mg daily also. Hypertriglyceridemia  -     Lipid Panel; Future    Lipoma of neck  Patient to follow-up with general surgery to have this removed to this may be a cause for the patient's ongoing neck pain issues. Impaired fasting glucose  -     Hemoglobin A1C; Future    Labs prior to next appointment. Return in about 4 weeks (around 2/27/2023) for To Discuss Labs Results, Follow-up Appointment From Today's Visit. An [employer] H[employer]ignature was used to authenticate this note.     --Sharon Avelar MD on 1/30/23 at 8:30 AM EST

## 2023-05-23 DIAGNOSIS — I10 ESSENTIAL HYPERTENSION: ICD-10-CM

## 2023-05-24 RX ORDER — LOSARTAN POTASSIUM 50 MG/1
100 TABLET ORAL DAILY
Qty: 180 TABLET | Refills: 0 | Status: SHIPPED | OUTPATIENT
Start: 2023-05-24 | End: 2023-08-22

## 2023-08-04 ENCOUNTER — OFFICE VISIT (OUTPATIENT)
Dept: FAMILY MEDICINE CLINIC | Age: 63
End: 2023-08-04
Payer: MEDICAID

## 2023-08-04 VITALS
TEMPERATURE: 97.4 F | DIASTOLIC BLOOD PRESSURE: 76 MMHG | BODY MASS INDEX: 32.99 KG/M2 | WEIGHT: 204.4 LBS | HEART RATE: 58 BPM | SYSTOLIC BLOOD PRESSURE: 144 MMHG | OXYGEN SATURATION: 96 %

## 2023-08-04 DIAGNOSIS — R10.9 FLANK PAIN: Primary | ICD-10-CM

## 2023-08-04 LAB
BILIRUBIN, POC: NORMAL
BLOOD URINE, POC: NORMAL
CLARITY, POC: NORMAL
COLOR, POC: YELLOW
GLUCOSE URINE, POC: NORMAL
KETONES, POC: NORMAL
LEUKOCYTE EST, POC: NORMAL
NITRITE, POC: NORMAL
PH, POC: 6
PROTEIN, POC: NORMAL
SPECIFIC GRAVITY, POC: >=1.03
UROBILINOGEN, POC: 1

## 2023-08-04 PROCEDURE — 81002 URINALYSIS NONAUTO W/O SCOPE: CPT | Performed by: NURSE PRACTITIONER

## 2023-08-04 PROCEDURE — G8427 DOCREV CUR MEDS BY ELIG CLIN: HCPCS | Performed by: NURSE PRACTITIONER

## 2023-08-04 PROCEDURE — 1036F TOBACCO NON-USER: CPT | Performed by: NURSE PRACTITIONER

## 2023-08-04 PROCEDURE — 3078F DIAST BP <80 MM HG: CPT | Performed by: NURSE PRACTITIONER

## 2023-08-04 PROCEDURE — 3077F SYST BP >= 140 MM HG: CPT | Performed by: NURSE PRACTITIONER

## 2023-08-04 PROCEDURE — 99214 OFFICE O/P EST MOD 30 MIN: CPT | Performed by: NURSE PRACTITIONER

## 2023-08-04 PROCEDURE — G8417 CALC BMI ABV UP PARAM F/U: HCPCS | Performed by: NURSE PRACTITIONER

## 2023-08-04 PROCEDURE — 3017F COLORECTAL CA SCREEN DOC REV: CPT | Performed by: NURSE PRACTITIONER

## 2023-08-07 ENCOUNTER — HOSPITAL ENCOUNTER (EMERGENCY)
Age: 63
Discharge: HOME | End: 2023-08-07
Attending: EMERGENCY MEDICINE
Payer: MEDICAID

## 2023-08-07 ENCOUNTER — APPOINTMENT (OUTPATIENT)
Dept: CT IMAGING | Age: 63
End: 2023-08-07
Payer: MEDICAID

## 2023-08-07 ENCOUNTER — APPOINTMENT (OUTPATIENT)
Dept: ULTRASOUND IMAGING | Age: 63
End: 2023-08-07
Payer: MEDICAID

## 2023-08-07 VITALS
SYSTOLIC BLOOD PRESSURE: 155 MMHG | TEMPERATURE: 98.4 F | HEIGHT: 66 IN | DIASTOLIC BLOOD PRESSURE: 67 MMHG | OXYGEN SATURATION: 99 % | RESPIRATION RATE: 14 BRPM | HEART RATE: 50 BPM | BODY MASS INDEX: 33.75 KG/M2 | WEIGHT: 210 LBS

## 2023-08-07 DIAGNOSIS — R03.0 ELEVATED BLOOD PRESSURE READING: ICD-10-CM

## 2023-08-07 DIAGNOSIS — I86.1 VARICOCELE: ICD-10-CM

## 2023-08-07 DIAGNOSIS — K57.90 DIVERTICULOSIS: ICD-10-CM

## 2023-08-07 DIAGNOSIS — R10.30 LOWER ABDOMINAL PAIN: Primary | ICD-10-CM

## 2023-08-07 LAB
ALBUMIN SERPL-MCNC: 4.5 G/DL (ref 3.5–5.2)
ALP SERPL-CCNC: 74 U/L (ref 40–129)
ALT SERPL-CCNC: 36 U/L (ref 0–40)
ANION GAP SERPL CALCULATED.3IONS-SCNC: 10 MMOL/L (ref 7–16)
AST SERPL-CCNC: 32 U/L (ref 0–39)
BASOPHILS # BLD: 0.05 K/UL (ref 0–0.2)
BASOPHILS NFR BLD: 1 % (ref 0–2)
BILIRUB SERPL-MCNC: 1.2 MG/DL (ref 0–1.2)
BILIRUB UR QL STRIP: NEGATIVE
BUN SERPL-MCNC: 11 MG/DL (ref 6–23)
CALCIUM SERPL-MCNC: 9.1 MG/DL (ref 8.6–10.2)
CHLORIDE SERPL-SCNC: 103 MMOL/L (ref 98–107)
CLARITY UR: CLEAR
CO2 SERPL-SCNC: 25 MMOL/L (ref 22–29)
COLOR UR: YELLOW
CREAT SERPL-MCNC: 0.9 MG/DL (ref 0.7–1.2)
EOSINOPHIL # BLD: 0.08 K/UL (ref 0.05–0.5)
EOSINOPHILS RELATIVE PERCENT: 1 % (ref 0–6)
ERYTHROCYTE [DISTWIDTH] IN BLOOD BY AUTOMATED COUNT: 12.7 % (ref 11.5–15)
GFR SERPL CREATININE-BSD FRML MDRD: >60 ML/MIN/1.73M2
GLUCOSE SERPL-MCNC: 93 MG/DL (ref 74–99)
GLUCOSE UR STRIP-MCNC: NEGATIVE MG/DL
HCT VFR BLD AUTO: 44.6 % (ref 37–54)
HGB BLD-MCNC: 15 G/DL (ref 12.5–16.5)
HGB UR QL STRIP.AUTO: NEGATIVE
IMM GRANULOCYTES # BLD AUTO: 0.04 K/UL (ref 0–0.58)
IMM GRANULOCYTES NFR BLD: 1 % (ref 0–5)
KETONES UR STRIP-MCNC: ABNORMAL MG/DL
LACTATE BLDV-SCNC: 0.9 MMOL/L (ref 0.5–2.2)
LEUKOCYTE ESTERASE UR QL STRIP: NEGATIVE
LIPASE SERPL-CCNC: 22 U/L (ref 13–60)
LYMPHOCYTES NFR BLD: 2.36 K/UL (ref 1.5–4)
LYMPHOCYTES RELATIVE PERCENT: 29 % (ref 20–42)
MCH RBC QN AUTO: 32.1 PG (ref 26–35)
MCHC RBC AUTO-ENTMCNC: 33.6 G/DL (ref 32–34.5)
MCV RBC AUTO: 95.5 FL (ref 80–99.9)
MONOCYTES NFR BLD: 0.82 K/UL (ref 0.1–0.95)
MONOCYTES NFR BLD: 10 % (ref 2–12)
NEUTROPHILS NFR BLD: 59 % (ref 43–80)
NEUTS SEG NFR BLD: 4.73 K/UL (ref 1.8–7.3)
NITRITE UR QL STRIP: NEGATIVE
PH UR STRIP: 6 [PH] (ref 5–9)
PLATELET # BLD AUTO: 310 K/UL (ref 130–450)
PMV BLD AUTO: 9.3 FL (ref 7–12)
POTASSIUM SERPL-SCNC: 4.2 MMOL/L (ref 3.5–5)
PROT SERPL-MCNC: 7.7 G/DL (ref 6.4–8.3)
PROT UR STRIP-MCNC: NEGATIVE MG/DL
RBC # BLD AUTO: 4.67 M/UL (ref 3.8–5.8)
RBC #/AREA URNS HPF: ABNORMAL /HPF
SODIUM SERPL-SCNC: 138 MMOL/L (ref 132–146)
SP GR UR STRIP: 1.01 (ref 1–1.03)
UROBILINOGEN UR STRIP-ACNC: 4 EU/DL (ref 0–1)
WBC #/AREA URNS HPF: ABNORMAL /HPF
WBC OTHER # BLD: 8.1 K/UL (ref 4.5–11.5)

## 2023-08-07 PROCEDURE — 80053 COMPREHEN METABOLIC PANEL: CPT

## 2023-08-07 PROCEDURE — 74177 CT ABD & PELVIS W/CONTRAST: CPT

## 2023-08-07 PROCEDURE — 6360000004 HC RX CONTRAST MEDICATION: Performed by: RADIOLOGY

## 2023-08-07 PROCEDURE — 2580000003 HC RX 258: Performed by: PHYSICIAN ASSISTANT

## 2023-08-07 PROCEDURE — 83690 ASSAY OF LIPASE: CPT

## 2023-08-07 PROCEDURE — 81001 URINALYSIS AUTO W/SCOPE: CPT

## 2023-08-07 PROCEDURE — 93975 VASCULAR STUDY: CPT

## 2023-08-07 PROCEDURE — 99285 EMERGENCY DEPT VISIT HI MDM: CPT

## 2023-08-07 PROCEDURE — 83605 ASSAY OF LACTIC ACID: CPT

## 2023-08-07 PROCEDURE — 85025 COMPLETE CBC W/AUTO DIFF WBC: CPT

## 2023-08-07 PROCEDURE — 76705 ECHO EXAM OF ABDOMEN: CPT

## 2023-08-07 PROCEDURE — 76870 US EXAM SCROTUM: CPT

## 2023-08-07 RX ORDER — 0.9 % SODIUM CHLORIDE 0.9 %
1000 INTRAVENOUS SOLUTION INTRAVENOUS ONCE
Status: COMPLETED | OUTPATIENT
Start: 2023-08-07 | End: 2023-08-07

## 2023-08-07 RX ORDER — TRAMADOL HYDROCHLORIDE 50 MG/1
50 TABLET ORAL EVERY 8 HOURS PRN
Qty: 9 TABLET | Refills: 0 | Status: SHIPPED | OUTPATIENT
Start: 2023-08-07 | End: 2023-08-10

## 2023-08-07 RX ADMIN — SODIUM CHLORIDE 1000 ML: 9 INJECTION, SOLUTION INTRAVENOUS at 11:44

## 2023-08-07 RX ADMIN — IOPAMIDOL 75 ML: 755 INJECTION, SOLUTION INTRAVENOUS at 13:19

## 2023-08-07 ASSESSMENT — PAIN DESCRIPTION - FREQUENCY: FREQUENCY: CONTINUOUS

## 2023-08-07 ASSESSMENT — PAIN SCALES - GENERAL: PAINLEVEL_OUTOF10: 3

## 2023-08-07 ASSESSMENT — PAIN DESCRIPTION - LOCATION: LOCATION: ABDOMEN

## 2023-08-07 ASSESSMENT — PAIN DESCRIPTION - PAIN TYPE: TYPE: ACUTE PAIN

## 2023-08-07 ASSESSMENT — PAIN DESCRIPTION - ORIENTATION: ORIENTATION: LOWER;RIGHT

## 2023-08-07 ASSESSMENT — PAIN - FUNCTIONAL ASSESSMENT: PAIN_FUNCTIONAL_ASSESSMENT: NONE - DENIES PAIN

## 2023-08-07 NOTE — ED TRIAGE NOTES
Department of Emergency Medicine  FIRST PROVIDER TRIAGE NOTE             Independent MLP           8/7/23  10:49 AM EDT    Date of Encounter: 8/7/23   MRN: [id number]      HPI: Meg Mixon is a 58 y.o. male who presents to the ED for Abdominal Pain (RLQ) and Leg Pain (Rt  leg pain down to foot)    Right lower quadrant abdominal pain. He was seen by his PCP on August 4, 2023 for flank pain and was told to come to the emergency department for CT scan. Patient stated he waited to see if it go away and came back due to pain in the right lower quadrant and pain going down his right leg. ROS: Negative for cp, sob, or fever. PE: Gen Appearance/Constitutional: alert  Musculoskeletal: moves all extremities x 4     Initial Plan of Care: All treatment areas with department are currently occupied. Plan to order/Initiate the following while awaiting opening in ED: to [id number] [facility].   Initiate Treatment-Testing, Proceed toTreatment Area When Bed Available for ED Attending/MLP to Continue Care    Electronically signed by RADHA Burgess CNP   DD: 8/7/23

## 2023-08-07 NOTE — ED PROVIDER NOTES
Shared BERNADETTE-ED Attending Visit. CC: No          [facility]  Department of Emergency Medicine   ED  Encounter Note  Admit Date/RoomTime: 2023 11:04 AM  ED Room:     NAME: Jonas Ravi  : 1960  MRN: [id number]     Chief Complaint:  Abdominal Pain (RLQ) and Leg Pain (Rt  leg pain down to foot)    History of Present Illness        Jonas Ravi is a 58 y.o. old male who presents to the emergency department by private vehicle, for intermittent episodes sharp pain in the R lower quadrant with radiation to the right groin which began 1.5 weeks prior to arrival.  He states 1 and half weeks ago it started in his right flank and went down into the right lower quadrant. He states that sometimes goes into his groin. He notes that he does have a hernia of unknown location. There has been similar episodes in the past with history of diverticulosis and kidney stones. Since onset the symptoms have been intermittent. The pain is associated with no additional symptoms. The pain is aggravated by lying down at night and relieved during the day. He states he mostly feels the pain at night and it gets better in the morning. He denies any fall or trauma. 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 There has been NO back pain, chest pain, shortness of breath, fever, chills, sweating, nausea, vomiting, anorexia, weight loss, diarrhea, constipation, dark/black stools, blood in stool, cloudy urine, urinary frequency, dysuria, hematuria, urinary urgency, urinary incontinence, scrotal pain/rash/wounds, or penile discharge. Patient notes he went to his PCP who tested his urine and there was no blood in the urine. He states that his PCP told him to go to the ER for CT scan as he refused outpt CT scan. He states that this was on Friday and the symptoms have been coming and going since then.   He states he is here today because it is not getting any

## 2023-08-27 DIAGNOSIS — I10 ESSENTIAL HYPERTENSION: ICD-10-CM

## 2023-08-28 RX ORDER — LOSARTAN POTASSIUM 50 MG/1
100 TABLET ORAL DAILY
Qty: 180 TABLET | Refills: 0 | Status: SHIPPED | OUTPATIENT
Start: 2023-08-28 | End: 2023-11-26

## 2023-08-28 NOTE — TELEPHONE ENCOUNTER
Patients next scheduled appointment   Future Appointments   Date Time Provider [address]   10/10/2023  4:00 PM DO NEIL Alvarenga [facility]     refill

## 2023-11-26 DIAGNOSIS — I10 ESSENTIAL HYPERTENSION: ICD-10-CM

## 2023-11-28 RX ORDER — LOSARTAN POTASSIUM 50 MG/1
100 TABLET ORAL DAILY
Qty: 180 TABLET | Refills: 0 | OUTPATIENT
Start: 2023-11-28 | End: 2024-02-26

## 2023-12-27 ENCOUNTER — OFFICE VISIT (OUTPATIENT)
Dept: FAMILY MEDICINE CLINIC | Age: 63
End: 2023-12-27
Payer: MEDICAID

## 2023-12-27 ENCOUNTER — NURSE TRIAGE (OUTPATIENT)
Dept: OTHER | Facility: CLINIC | Age: 63
End: 2023-12-27

## 2023-12-27 VITALS
TEMPERATURE: 97.8 F | SYSTOLIC BLOOD PRESSURE: 172 MMHG | BODY MASS INDEX: 33.89 KG/M2 | WEIGHT: 210 LBS | DIASTOLIC BLOOD PRESSURE: 70 MMHG | HEART RATE: 56 BPM | OXYGEN SATURATION: 96 %

## 2023-12-27 DIAGNOSIS — R20.0 NUMBNESS IN FEET: ICD-10-CM

## 2023-12-27 DIAGNOSIS — I10 ESSENTIAL HYPERTENSION: Primary | ICD-10-CM

## 2023-12-27 PROCEDURE — G8484 FLU IMMUNIZE NO ADMIN: HCPCS | Performed by: STUDENT IN AN ORGANIZED HEALTH CARE EDUCATION/TRAINING PROGRAM

## 2023-12-27 PROCEDURE — 3077F SYST BP >= 140 MM HG: CPT | Performed by: STUDENT IN AN ORGANIZED HEALTH CARE EDUCATION/TRAINING PROGRAM

## 2023-12-27 PROCEDURE — G8427 DOCREV CUR MEDS BY ELIG CLIN: HCPCS | Performed by: STUDENT IN AN ORGANIZED HEALTH CARE EDUCATION/TRAINING PROGRAM

## 2023-12-27 PROCEDURE — 3017F COLORECTAL CA SCREEN DOC REV: CPT | Performed by: STUDENT IN AN ORGANIZED HEALTH CARE EDUCATION/TRAINING PROGRAM

## 2023-12-27 PROCEDURE — 3078F DIAST BP <80 MM HG: CPT | Performed by: STUDENT IN AN ORGANIZED HEALTH CARE EDUCATION/TRAINING PROGRAM

## 2023-12-27 PROCEDURE — G8417 CALC BMI ABV UP PARAM F/U: HCPCS | Performed by: STUDENT IN AN ORGANIZED HEALTH CARE EDUCATION/TRAINING PROGRAM

## 2023-12-27 PROCEDURE — 1036F TOBACCO NON-USER: CPT | Performed by: STUDENT IN AN ORGANIZED HEALTH CARE EDUCATION/TRAINING PROGRAM

## 2023-12-27 PROCEDURE — 99214 OFFICE O/P EST MOD 30 MIN: CPT | Performed by: STUDENT IN AN ORGANIZED HEALTH CARE EDUCATION/TRAINING PROGRAM

## 2023-12-27 RX ORDER — LOSARTAN POTASSIUM AND HYDROCHLOROTHIAZIDE 12.5; 5 MG/1; MG/1
1 TABLET ORAL DAILY
Qty: 30 TABLET | Refills: 3 | Status: SHIPPED | OUTPATIENT
Start: 2023-12-27

## 2023-12-27 NOTE — TELEPHONE ENCOUNTER
Location of patient: [address] call from [place] at [facility]; Patient with [employer] Complaint requesting to establish care with [facility]. Subjective: Caller states \"numbness in feet, worse in right feet. \"     Current Symptoms: numbness in both feet. Right worse than left. Worse in the afternoon and after wearing shoes. Similar to repetitive motion sickness    Intermittent chest tightness, notices when laying down. Can last  for several minutes up to hour. Upper chest around lino's apple. Denies arm, neck or jaw pain, SOB. Feels like something in throat. Tightness in throat after eating. (Told MD at last bite). Pain under right rib cage, usually occurs first thing in the morning. Hoarse voice    Possible swollen prostate    Onset: a few months ago; waxing and waning    Associated Symptoms: NA    Pain Severity: 2-/10; numbness; constant, waxing and waning    Temperature: none     What has been tried: exercise    LMP: NA Pregnant: NA    Recommended disposition: Go to [facility]    Walk in clinic today    Care advice provided, patient verbalizes understanding; denies any other questions or concerns; instructed to call back for any new or worsening symptoms. Patient/Caller agrees with recommended disposition; writer provided warm transfer to Gregg Albert at [facility] for appointment scheduling for new patient appt. Attention Provider: Thank you for allowing me to participate in the care of your patient. The patient was connected to triage in response to information provided to the [facility]. Please do not respond through this encounter as the response is not directed to a shared pool.       Reason for Disposition   Numbness or tingling in one or both feet is a chronic symptom (recurrent or ongoing problem lasting > 4 weeks)    Protocols used: Neurologic Deficit-ADULT-[place]

## 2023-12-27 NOTE — PROGRESS NOTES
facility-administered medications for this visit. Amie Landis, DO       This document may have been prepared at least partially through the use of voice recognition software. Although effort is taken to assure the accuracy ofthis document, it is possible that grammatical, syntax,  or spelling errors may occur.

## 2024-01-08 ENCOUNTER — TELEPHONE (OUTPATIENT)
Dept: PRIMARY CARE CLINIC | Age: 64
End: 2024-01-08

## 2024-01-08 NOTE — TELEPHONE ENCOUNTER
----- Message from Melani Tenzin sent at 1/8/2024  8:07 AM EST -----  Subject: Message to Provider    QUESTIONS  Information for Provider? Please call patient regarding EMG test ordered.   He has not been contacted yet and does not know where he needs to have the   test done.   ---------------------------------------------------------------------------  --------------  CALL BACK INFO  [phone]; Do not leave any message, patient will call back for answer  ---------------------------------------------------------------------------  --------------  SCRIPT ANSWERS  Relationship to Patient? Self

## 2024-01-08 NOTE — TELEPHONE ENCOUNTER
1/8/24 spoke to patient via phone call and gave him the neurology scheduling number to call and get the emg scheduled. [phone]

## 2024-01-12 ENCOUNTER — OFFICE VISIT (OUTPATIENT)
Dept: PRIMARY CARE CLINIC | Age: 64
End: 2024-01-12
Payer: MEDICAID

## 2024-01-12 VITALS
OXYGEN SATURATION: 96 % | HEIGHT: 66 IN | TEMPERATURE: 97.4 F | SYSTOLIC BLOOD PRESSURE: 138 MMHG | HEART RATE: 60 BPM | DIASTOLIC BLOOD PRESSURE: 64 MMHG | RESPIRATION RATE: 16 BRPM | BODY MASS INDEX: 33.37 KG/M2 | WEIGHT: 207.6 LBS

## 2024-01-12 DIAGNOSIS — I10 ESSENTIAL HYPERTENSION: Primary | ICD-10-CM

## 2024-01-12 DIAGNOSIS — Z76.89 ENCOUNTER TO ESTABLISH CARE: ICD-10-CM

## 2024-01-12 DIAGNOSIS — R73.01 IMPAIRED FASTING GLUCOSE: ICD-10-CM

## 2024-01-12 DIAGNOSIS — E78.1 HYPERTRIGLYCERIDEMIA: ICD-10-CM

## 2024-01-12 DIAGNOSIS — I10 ESSENTIAL HYPERTENSION: ICD-10-CM

## 2024-01-12 DIAGNOSIS — D17.0 LIPOMA OF NECK: ICD-10-CM

## 2024-01-12 DIAGNOSIS — R20.0 NUMBNESS IN FEET: ICD-10-CM

## 2024-01-12 PROBLEM — R07.89 CHEST PAIN, ATYPICAL: Status: RESOLVED | Noted: 2018-09-24 | Resolved: 2024-01-12

## 2024-01-12 LAB
ABSOLUTE IMMATURE GRANULOCYTE: <0.03 K/UL (ref 0–0.58)
ALBUMIN SERPL-MCNC: 4.3 G/DL (ref 3.5–5.2)
ALP BLD-CCNC: 77 U/L (ref 40–129)
ALT SERPL-CCNC: 35 U/L (ref 0–40)
ANION GAP SERPL CALCULATED.3IONS-SCNC: 13 MMOL/L (ref 7–16)
AST SERPL-CCNC: 22 U/L (ref 0–39)
BASOPHILS ABSOLUTE: 0.05 K/UL (ref 0–0.2)
BASOPHILS RELATIVE PERCENT: 1 % (ref 0–2)
BILIRUB SERPL-MCNC: 0.8 MG/DL (ref 0–1.2)
BUN BLDV-MCNC: 15 MG/DL (ref 6–23)
CALCIUM SERPL-MCNC: 9.3 MG/DL (ref 8.6–10.2)
CHLORIDE BLD-SCNC: 100 MMOL/L (ref 98–107)
CHOLESTEROL: 160 MG/DL
CO2: 24 MMOL/L (ref 22–29)
CREAT SERPL-MCNC: 0.9 MG/DL (ref 0.7–1.2)
EOSINOPHILS ABSOLUTE: 0.13 K/UL (ref 0.05–0.5)
EOSINOPHILS RELATIVE PERCENT: 2 % (ref 0–6)
GFR SERPL CREATININE-BSD FRML MDRD: >60 ML/MIN/1.73M2
GLUCOSE BLD-MCNC: 92 MG/DL (ref 74–99)
HBA1C MFR BLD: 5.9 % (ref 4–5.6)
HCT VFR BLD CALC: 44.3 % (ref 37–54)
HDLC SERPL-MCNC: 34 MG/DL
HEMOGLOBIN: 14.6 G/DL (ref 12.5–16.5)
IMMATURE GRANULOCYTES: 0 % (ref 0–5)
LDL CHOLESTEROL: 98 MG/DL
LYMPHOCYTES ABSOLUTE: 2.23 K/UL (ref 1.5–4)
LYMPHOCYTES RELATIVE PERCENT: 30 % (ref 20–42)
MCH RBC QN AUTO: 31.7 PG (ref 26–35)
MCHC RBC AUTO-ENTMCNC: 33 G/DL (ref 32–34.5)
MCV RBC AUTO: 96.3 FL (ref 80–99.9)
MONOCYTES ABSOLUTE: 0.74 K/UL (ref 0.1–0.95)
MONOCYTES RELATIVE PERCENT: 10 % (ref 2–12)
NEUTROPHILS ABSOLUTE: 4.37 K/UL (ref 1.8–7.3)
NEUTROPHILS RELATIVE PERCENT: 58 % (ref 43–80)
PDW BLD-RTO: 12.5 % (ref 11.5–15)
PLATELET # BLD: 276 K/UL (ref 130–450)
PMV BLD AUTO: 10.1 FL (ref 7–12)
POTASSIUM SERPL-SCNC: 3.8 MMOL/L (ref 3.5–5)
RBC # BLD: 4.6 M/UL (ref 3.8–5.8)
SODIUM BLD-SCNC: 137 MMOL/L (ref 132–146)
T4 FREE: 1.5 NG/DL (ref 0.9–1.7)
TOTAL PROTEIN: 7.7 G/DL (ref 6.4–8.3)
TRIGL SERPL-MCNC: 141 MG/DL
TSH SERPL DL<=0.05 MIU/L-ACNC: 0.73 UIU/ML (ref 0.27–4.2)
VLDLC SERPL CALC-MCNC: 28 MG/DL
WBC # BLD: 7.5 K/UL (ref 4.5–11.5)

## 2024-01-12 PROCEDURE — 36415 COLL VENOUS BLD VENIPUNCTURE: CPT | Performed by: PHYSICIAN ASSISTANT

## 2024-01-12 PROCEDURE — 3075F SYST BP GE 130 - 139MM HG: CPT | Performed by: PHYSICIAN ASSISTANT

## 2024-01-12 PROCEDURE — 3078F DIAST BP <80 MM HG: CPT | Performed by: PHYSICIAN ASSISTANT

## 2024-01-12 PROCEDURE — G8417 CALC BMI ABV UP PARAM F/U: HCPCS | Performed by: PHYSICIAN ASSISTANT

## 2024-01-12 PROCEDURE — 1036F TOBACCO NON-USER: CPT | Performed by: PHYSICIAN ASSISTANT

## 2024-01-12 PROCEDURE — G8484 FLU IMMUNIZE NO ADMIN: HCPCS | Performed by: PHYSICIAN ASSISTANT

## 2024-01-12 PROCEDURE — G8427 DOCREV CUR MEDS BY ELIG CLIN: HCPCS | Performed by: PHYSICIAN ASSISTANT

## 2024-01-12 PROCEDURE — 3017F COLORECTAL CA SCREEN DOC REV: CPT | Performed by: PHYSICIAN ASSISTANT

## 2024-01-12 PROCEDURE — 99214 OFFICE O/P EST MOD 30 MIN: CPT | Performed by: PHYSICIAN ASSISTANT

## 2024-01-12 SDOH — ECONOMIC STABILITY: INCOME INSECURITY: HOW HARD IS IT FOR YOU TO PAY FOR THE VERY BASICS LIKE FOOD, HOUSING, MEDICAL CARE, AND HEATING?: NOT HARD AT ALL

## 2024-01-12 SDOH — ECONOMIC STABILITY: HOUSING INSECURITY
IN THE LAST 12 MONTHS, WAS THERE A TIME WHEN YOU DID NOT HAVE A STEADY PLACE TO SLEEP OR SLEPT IN A SHELTER (INCLUDING NOW)?: NO

## 2024-01-12 SDOH — ECONOMIC STABILITY: FOOD INSECURITY: WITHIN THE PAST 12 MONTHS, YOU WORRIED THAT YOUR FOOD WOULD RUN OUT BEFORE YOU GOT MONEY TO BUY MORE.: NEVER TRUE

## 2024-01-12 SDOH — ECONOMIC STABILITY: FOOD INSECURITY: WITHIN THE PAST 12 MONTHS, THE FOOD YOU BOUGHT JUST DIDN'T LAST AND YOU DIDN'T HAVE MONEY TO GET MORE.: NEVER TRUE

## 2024-01-12 ASSESSMENT — PATIENT HEALTH QUESTIONNAIRE - PHQ9
1. LITTLE INTEREST OR PLEASURE IN DOING THINGS: 0
SUM OF ALL RESPONSES TO PHQ QUESTIONS 1-9: 0
2. FEELING DOWN, DEPRESSED OR HOPELESS: 0
SUM OF ALL RESPONSES TO PHQ QUESTIONS 1-9: 0
SUM OF ALL RESPONSES TO PHQ9 QUESTIONS 1 & 2: 0
SUM OF ALL RESPONSES TO PHQ QUESTIONS 1-9: 0
SUM OF ALL RESPONSES TO PHQ QUESTIONS 1-9: 0

## 2024-01-12 NOTE — PROGRESS NOTES
HPI:  The patient is a 63 y.o. male who presents today to establish care.  The patient complains of :  Bilateral numbness in feet  -chronic issue  -been going on for over a year now  -denies any hx of diabetes   -unclear if this is getting worse   -denies any pins and needles   - missed EMG apt    HTN  - losartan hctz recently started    Lipoma on neck  -referred in  for resection, he didn't go, increased size    Past Medical History:   Diagnosis Date    Anxiety     GERD (gastroesophageal reflux disease)     Herpes     Hypertension     Kidney stones       Past Surgical History:   Procedure Laterality Date    COLONOSCOPY      few years ago    FINGER SURGERY Left 25    left hand surgery - tendons    UPPER GASTROINTESTINAL ENDOSCOPY N/A 2022    EGD DILATION SAVORY performed by John Denny MD at [facility] ENDOSCOPY      Family History   Problem Relation Age of Onset    Cancer Mother 70        ovarian    Depression Sister     Substance Abuse Brother     No Known Problems Sister     No Known Problems Sister     No Known Problems Sister     No Known Problems Sister     No Known Problems Brother     Alcohol Abuse Son     Substance Abuse Son      Social History     Socioeconomic History    Marital status: Legally      Spouse name: Not on file    Number of children: Not on file    Years of education: Not on file    Highest education level: Not on file   Occupational History    Not on file   Tobacco Use    Smoking status: Former     Current packs/day: 0.00     Average packs/day: 0.5 packs/day for 15.0 years (7.5 ttl pk-yrs)     Types: Cigarettes     Start date: 1975     Quit date: 1990     Years since quittin.0     Passive exposure: Past    Smokeless tobacco: Never   Vaping Use    Vaping Use: Never used   Substance and Sexual Activity    Alcohol use: Yes     Alcohol/week: 2.0 standard drinks of alcohol     Types: 2 Cans of beer per week     Comment: occasional    Drug use: No    Sexual

## 2024-01-16 ENCOUNTER — HOSPITAL ENCOUNTER (OUTPATIENT)
Dept: NEUROLOGY | Age: 64
Discharge: HOME | End: 2024-01-16
Payer: MEDICAID

## 2024-01-16 DIAGNOSIS — R20.0 NUMBNESS IN FEET: ICD-10-CM

## 2024-01-16 PROCEDURE — 95886 MUSC TEST DONE W/N TEST COMP: CPT

## 2024-01-16 PROCEDURE — 95911 NRV CNDJ TEST 9-10 STUDIES: CPT

## 2024-01-16 NOTE — PROCEDURES
as posterior muscles.  In the lower lumbar paraspinals no positive waves or fibrillations were noted at the L5-S1 level bilaterally..    Impression:  Study compatible with the following:  Normal nerve conduction studies of the following nerves bilaterally: Peroneal, tibial, superficial peroneal, sural.  Prolonged H reflex left lower extremity, compatible with S1 pathology  Insertional activity demonstrating positive waves, increased duration, and increased polyphasic units compatible with neuropathic process affecting fibers of the L5-S1 bilaterally.  This can be compatible with a polyradiculopathy involving the lumbar- sacral areas or a central stenosis  No evidence of a primary myopathy  No evidence of a lower extremity entrapment syndrome  No evidence of a peripheral neuropathy.    Recommendations: Imaging studies in the form of MRI of the lumbosacral spine would be helpful in defining pathology.  Based upon the results, further treatment and management protocols can be discussed with the patient.  Referral back to primary care provider.  These issues have been discussed with the patient.  Thank you    Clinical correlation recommended.        Electronically signed by Holly Koo MD on 1/16/2024 at 11:58 AM

## 2024-01-17 ENCOUNTER — TELEPHONE (OUTPATIENT)
Dept: PRIMARY CARE CLINIC | Age: 64
End: 2024-01-17

## 2024-01-17 DIAGNOSIS — R94.131 ABNORMAL EMG: ICD-10-CM

## 2024-01-17 DIAGNOSIS — M54.10 POLYRADICULOPATHY: Primary | ICD-10-CM

## 2024-01-30 ENCOUNTER — HOSPITAL ENCOUNTER (OUTPATIENT)
Dept: MRI IMAGING | Age: 64
Discharge: HOME | End: 2024-02-01
Payer: MEDICAID

## 2024-01-30 DIAGNOSIS — M54.10 POLYRADICULOPATHY: ICD-10-CM

## 2024-01-30 DIAGNOSIS — R94.131 ABNORMAL EMG: ICD-10-CM

## 2024-01-30 PROCEDURE — 72148 MRI LUMBAR SPINE W/O DYE: CPT

## 2024-02-20 ENCOUNTER — OFFICE VISIT (OUTPATIENT)
Dept: SURGERY | Age: 64
End: 2024-02-20
Payer: MEDICAID

## 2024-02-20 VITALS
SYSTOLIC BLOOD PRESSURE: 149 MMHG | HEIGHT: 65 IN | TEMPERATURE: 98.9 F | BODY MASS INDEX: 34.45 KG/M2 | WEIGHT: 206.8 LBS | HEART RATE: 63 BPM | DIASTOLIC BLOOD PRESSURE: 61 MMHG | OXYGEN SATURATION: 98 %

## 2024-02-20 DIAGNOSIS — R22.0 MASS OF POSTAURICULAR AREA: Primary | ICD-10-CM

## 2024-02-20 PROCEDURE — G8417 CALC BMI ABV UP PARAM F/U: HCPCS | Performed by: SURGERY

## 2024-02-20 PROCEDURE — G8427 DOCREV CUR MEDS BY ELIG CLIN: HCPCS | Performed by: SURGERY

## 2024-02-20 PROCEDURE — 99203 OFFICE O/P NEW LOW 30 MIN: CPT | Performed by: SURGERY

## 2024-02-20 PROCEDURE — G8484 FLU IMMUNIZE NO ADMIN: HCPCS | Performed by: SURGERY

## 2024-02-20 PROCEDURE — 3077F SYST BP >= 140 MM HG: CPT | Performed by: SURGERY

## 2024-02-20 PROCEDURE — 3017F COLORECTAL CA SCREEN DOC REV: CPT | Performed by: SURGERY

## 2024-02-20 PROCEDURE — 1036F TOBACCO NON-USER: CPT | Performed by: SURGERY

## 2024-02-20 PROCEDURE — 3078F DIAST BP <80 MM HG: CPT | Performed by: SURGERY

## 2024-02-20 RX ORDER — CHOLECALCIFEROL (VITAMIN D3) 1250 MCG
CAPSULE ORAL
COMMUNITY

## 2024-02-20 RX ORDER — ASCORBIC ACID 500 MG
500 TABLET ORAL DAILY
COMMUNITY

## 2024-02-20 RX ORDER — ZINC GLUCONATE 50 MG
50 TABLET ORAL DAILY
COMMUNITY

## 2024-02-20 NOTE — PROGRESS NOTES
[facility] Clinic Note    Assessment/Plan:      Diagnosis Orders   1. Mass of right postauricular area      we will plan for excision.            Return for Surgery.      Chief Complaint   Patient presents with    New Patient     Lipoma on neck - right side        PCP: Yu Díaz PA-C    HPI: Danie Ireland is a 63 y.o. male who presents in consultation for right postauricular mass.  He has not had it for over 3 years.  He causes some discomfort.  He had ultrasound in 2021 which per the report shows a probable lipoma.  It has not gotten much bigger he reports.  There is no drainage from the lesion.       Past Medical History:   Diagnosis Date    Anxiety     GERD (gastroesophageal reflux disease)     Herpes     Hypertension     Kidney stones        Past Surgical History:   Procedure Laterality Date    COLONOSCOPY      few years ago    FINGER SURGERY Left 25    left hand surgery - tendons    UPPER GASTROINTESTINAL ENDOSCOPY N/A 11/28/2022    EGD DILATION SAVORY performed by John Denny MD at [facility] ENDOSCOPY       Prior to Admission medications    Medication Sig Start Date End Date Taking? Authorizing Provider   vitamin C (ASCORBIC ACID) 500 MG tablet Take 1 tablet by mouth daily   Yes Manny Badillo MD   Cholecalciferol (VITAMIN D3) 1.25 MG (80284 UT) CAPS Take by mouth   Yes Manny Badillo MD   zinc gluconate 50 MG tablet Take 1 tablet by mouth daily   Yes Manny Badillo MD   losartan-hydroCHLOROthiazide (HYZAAR) 50-12.5 MG per tablet Take 1 tablet by mouth daily 12/27/23  Yes Mario Nur, DO   ALBUTEROL IN Inhale into the lungs  Patient not taking: Reported on 1/12/2024    Manny Badillo MD       Allergies   Allergen Reactions    Codeine Other (See Comments)     irritability       Social History     Socioeconomic History    Marital status: Legally    Tobacco Use    Smoking status: Former     Current packs/day: 0.00     Average packs/day: 0.5 packs/day for

## 2024-02-26 ENCOUNTER — PREP FOR PROCEDURE (OUTPATIENT)
Dept: SURGERY | Age: 64
End: 2024-02-26

## 2024-02-26 ENCOUNTER — TELEPHONE (OUTPATIENT)
Dept: SURGERY | Age: 64
End: 2024-02-26

## 2024-02-26 DIAGNOSIS — I10 ESSENTIAL HYPERTENSION: ICD-10-CM

## 2024-02-26 DIAGNOSIS — R22.0 MASS OF POSTAURICULAR AREA: ICD-10-CM

## 2024-02-26 RX ORDER — LOSARTAN POTASSIUM AND HYDROCHLOROTHIAZIDE 12.5; 5 MG/1; MG/1
1 TABLET ORAL DAILY
Qty: 30 TABLET | Refills: 3 | Status: SHIPPED | OUTPATIENT
Start: 2024-02-26

## 2024-02-26 NOTE — TELEPHONE ENCOUNTER
Prior Authorization Form:      DEMOGRAPHICS:                     Patient Name:  Danie Gamboa  Patient :  1960            Insurance:  Payor: [employer] / Plan: [employer] [place]AN [place] / Product Type: *No Product type* /   Insurance ID Number:    Payer/Plan Subscr  Sex Relation Sub. Ins. ID Effective Group Num   1. [facility]* DANIE GAMBOA 1960 Male Self [id number]                                    [id number]         DIAGNOSIS & PROCEDURE:                       Procedure/Operation: Excision rt postauricular mass           CPT Code: 12854    Diagnosis:  Rt postauricular mass    ICD10 Code: R22.0    Location:  [facility]    Surgeon:  Dr Lopez    SCHEDULING INFORMATION:                          Date: 24    Time: 11:00 am              Anesthesia:  LMAC                                                       Status:  Outpatient        Special Comments:         Electronically signed by Yu Salas MA on 2024 at 2:27 PM

## 2024-02-26 NOTE — TELEPHONE ENCOUNTER
Danie Ireland is scheduled for excision rt post auricular mass with Dr Lopez on 03-18-24 at [facility]. Patient needs to be NPO after midnight the night before procedure. All surgery instructions were explained to the patient and a surgery letter was also mailed out. MA informed patient that PAT will also be calling to review pre-op instructions and medications. Patient verbalized understanding.  Electronically signed by Yu Salas MA on 2/26/2024 at 2:27 PM

## 2024-03-23 ENCOUNTER — APPOINTMENT (OUTPATIENT)
Dept: GENERAL RADIOLOGY | Age: 64
End: 2024-03-23
Payer: MEDICAID

## 2024-03-23 ENCOUNTER — HOSPITAL ENCOUNTER (EMERGENCY)
Age: 64
Discharge: HOME | End: 2024-03-23
Attending: EMERGENCY MEDICINE
Payer: MEDICAID

## 2024-03-23 VITALS
DIASTOLIC BLOOD PRESSURE: 63 MMHG | SYSTOLIC BLOOD PRESSURE: 140 MMHG | TEMPERATURE: 98.2 F | BODY MASS INDEX: 34.55 KG/M2 | RESPIRATION RATE: 15 BRPM | HEART RATE: 60 BPM | HEIGHT: 66 IN | OXYGEN SATURATION: 99 % | WEIGHT: 215 LBS

## 2024-03-23 DIAGNOSIS — J44.1 COPD EXACERBATION (HCC): Primary | ICD-10-CM

## 2024-03-23 LAB
ALBUMIN SERPL-MCNC: 4.4 G/DL (ref 3.5–5.2)
ALP SERPL-CCNC: 78 U/L (ref 40–129)
ALT SERPL-CCNC: 24 U/L (ref 0–40)
ANION GAP SERPL CALCULATED.3IONS-SCNC: 12 MMOL/L (ref 7–16)
AST SERPL-CCNC: 23 U/L (ref 0–39)
BASOPHILS # BLD: 0.05 K/UL (ref 0–0.2)
BASOPHILS NFR BLD: 1 % (ref 0–2)
BILIRUB SERPL-MCNC: 0.9 MG/DL (ref 0–1.2)
BILIRUB UR QL STRIP: NEGATIVE
BNP SERPL-MCNC: <36 PG/ML (ref 0–125)
BUN SERPL-MCNC: 11 MG/DL (ref 6–23)
CALCIUM SERPL-MCNC: 9.4 MG/DL (ref 8.6–10.2)
CHLORIDE SERPL-SCNC: 104 MMOL/L (ref 98–107)
CLARITY UR: CLEAR
CO2 SERPL-SCNC: 24 MMOL/L (ref 22–29)
COLOR UR: YELLOW
CREAT SERPL-MCNC: 0.9 MG/DL (ref 0.7–1.2)
D DIMER: <200 NG/ML DDU (ref 0–232)
EKG ATRIAL RATE: 56 BPM
EKG P AXIS: 37 DEGREES
EKG P-R INTERVAL: 172 MS
EKG Q-T INTERVAL: 432 MS
EKG QRS DURATION: 104 MS
EKG QTC CALCULATION (BAZETT): 416 MS
EKG R AXIS: -43 DEGREES
EKG T AXIS: 7 DEGREES
EKG VENTRICULAR RATE: 56 BPM
EOSINOPHIL # BLD: 0.12 K/UL (ref 0.05–0.5)
EOSINOPHILS RELATIVE PERCENT: 1 % (ref 0–6)
ERYTHROCYTE [DISTWIDTH] IN BLOOD BY AUTOMATED COUNT: 12.8 % (ref 11.5–15)
GFR SERPL CREATININE-BSD FRML MDRD: >60 ML/MIN/1.73M2
GLUCOSE SERPL-MCNC: 105 MG/DL (ref 74–99)
GLUCOSE UR STRIP-MCNC: NEGATIVE MG/DL
HCT VFR BLD AUTO: 43.1 % (ref 37–54)
HGB BLD-MCNC: 14.5 G/DL (ref 12.5–16.5)
HGB UR QL STRIP.AUTO: NEGATIVE
IMM GRANULOCYTES # BLD AUTO: 0.03 K/UL (ref 0–0.58)
IMM GRANULOCYTES NFR BLD: 0 % (ref 0–5)
INFLUENZA A BY PCR: NOT DETECTED
INFLUENZA B BY PCR: NOT DETECTED
KETONES UR STRIP-MCNC: NEGATIVE MG/DL
LACTATE BLDV-SCNC: 0.9 MMOL/L (ref 0.5–2.2)
LEUKOCYTE ESTERASE UR QL STRIP: NEGATIVE
LIPASE SERPL-CCNC: 20 U/L (ref 13–60)
LYMPHOCYTES NFR BLD: 1.5 K/UL (ref 1.5–4)
LYMPHOCYTES RELATIVE PERCENT: 17 % (ref 20–42)
MCH RBC QN AUTO: 31.6 PG (ref 26–35)
MCHC RBC AUTO-ENTMCNC: 33.6 G/DL (ref 32–34.5)
MCV RBC AUTO: 93.9 FL (ref 80–99.9)
MONOCYTES NFR BLD: 0.74 K/UL (ref 0.1–0.95)
MONOCYTES NFR BLD: 9 % (ref 2–12)
NEUTROPHILS NFR BLD: 72 % (ref 43–80)
NEUTS SEG NFR BLD: 6.26 K/UL (ref 1.8–7.3)
NITRITE UR QL STRIP: NEGATIVE
PH UR STRIP: 7 [PH] (ref 5–9)
PLATELET # BLD AUTO: 350 K/UL (ref 130–450)
PMV BLD AUTO: 9.2 FL (ref 7–12)
POTASSIUM SERPL-SCNC: 3.9 MMOL/L (ref 3.5–5)
PROT SERPL-MCNC: 8.3 G/DL (ref 6.4–8.3)
PROT UR STRIP-MCNC: NEGATIVE MG/DL
RBC # BLD AUTO: 4.59 M/UL (ref 3.8–5.8)
RBC #/AREA URNS HPF: NORMAL /HPF
SARS-COV-2 RDRP RESP QL NAA+PROBE: NOT DETECTED
SODIUM SERPL-SCNC: 140 MMOL/L (ref 132–146)
SP GR UR STRIP: 1.01 (ref 1–1.03)
SPECIMEN DESCRIPTION: NORMAL
TROPONIN I SERPL HS-MCNC: 9 NG/L (ref 0–11)
TROPONIN I SERPL HS-MCNC: <6 NG/L (ref 0–11)
UROBILINOGEN UR STRIP-ACNC: 0.2 EU/DL (ref 0–1)
WBC #/AREA URNS HPF: NORMAL /HPF
WBC OTHER # BLD: 8.7 K/UL (ref 4.5–11.5)

## 2024-03-23 PROCEDURE — 6360000002 HC RX W HCPCS

## 2024-03-23 PROCEDURE — A4216 STERILE WATER/SALINE, 10 ML: HCPCS

## 2024-03-23 PROCEDURE — 84484 ASSAY OF TROPONIN QUANT: CPT

## 2024-03-23 PROCEDURE — 81001 URINALYSIS AUTO W/SCOPE: CPT

## 2024-03-23 PROCEDURE — 83605 ASSAY OF LACTIC ACID: CPT

## 2024-03-23 PROCEDURE — 85025 COMPLETE CBC W/AUTO DIFF WBC: CPT

## 2024-03-23 PROCEDURE — 2580000003 HC RX 258

## 2024-03-23 PROCEDURE — 87502 INFLUENZA DNA AMP PROBE: CPT

## 2024-03-23 PROCEDURE — 85379 FIBRIN DEGRADATION QUANT: CPT

## 2024-03-23 PROCEDURE — 93005 ELECTROCARDIOGRAM TRACING: CPT

## 2024-03-23 PROCEDURE — C9113 INJ PANTOPRAZOLE SODIUM, VIA: HCPCS

## 2024-03-23 PROCEDURE — 87635 SARS-COV-2 COVID-19 AMP PRB: CPT

## 2024-03-23 PROCEDURE — 83690 ASSAY OF LIPASE: CPT

## 2024-03-23 PROCEDURE — 83880 ASSAY OF NATRIURETIC PEPTIDE: CPT

## 2024-03-23 PROCEDURE — 99285 EMERGENCY DEPT VISIT HI MDM: CPT

## 2024-03-23 PROCEDURE — 6370000000 HC RX 637 (ALT 250 FOR IP)

## 2024-03-23 PROCEDURE — 80053 COMPREHEN METABOLIC PANEL: CPT

## 2024-03-23 PROCEDURE — 94664 DEMO&/EVAL PT USE INHALER: CPT

## 2024-03-23 PROCEDURE — 71046 X-RAY EXAM CHEST 2 VIEWS: CPT

## 2024-03-23 PROCEDURE — 96375 TX/PRO/DX INJ NEW DRUG ADDON: CPT

## 2024-03-23 PROCEDURE — 96374 THER/PROPH/DIAG INJ IV PUSH: CPT

## 2024-03-23 RX ORDER — 0.9 % SODIUM CHLORIDE 0.9 %
1000 INTRAVENOUS SOLUTION INTRAVENOUS ONCE
Status: COMPLETED | OUTPATIENT
Start: 2024-03-23 | End: 2024-03-23

## 2024-03-23 RX ORDER — DEXAMETHASONE SODIUM PHOSPHATE 10 MG/ML
10 INJECTION INTRAMUSCULAR; INTRAVENOUS ONCE
Status: COMPLETED | OUTPATIENT
Start: 2024-03-23 | End: 2024-03-23

## 2024-03-23 RX ORDER — PREDNISONE 50 MG/1
50 TABLET ORAL DAILY
Qty: 5 TABLET | Refills: 0 | Status: SHIPPED | OUTPATIENT
Start: 2024-03-23 | End: 2024-03-28

## 2024-03-23 RX ORDER — ALBUTEROL SULFATE 90 UG/1
2 AEROSOL, METERED RESPIRATORY (INHALATION) 4 TIMES DAILY PRN
Qty: 18 G | Refills: 0 | Status: SHIPPED | OUTPATIENT
Start: 2024-03-23

## 2024-03-23 RX ORDER — IPRATROPIUM BROMIDE AND ALBUTEROL SULFATE 2.5; .5 MG/3ML; MG/3ML
1 SOLUTION RESPIRATORY (INHALATION) ONCE
Status: COMPLETED | OUTPATIENT
Start: 2024-03-23 | End: 2024-03-23

## 2024-03-23 RX ORDER — GUAIFENESIN 600 MG/1
600 TABLET, EXTENDED RELEASE ORAL 2 TIMES DAILY
Qty: 30 TABLET | Refills: 0 | Status: SHIPPED | OUTPATIENT
Start: 2024-03-23 | End: 2024-04-07

## 2024-03-23 RX ADMIN — SODIUM CHLORIDE 1000 ML: 9 INJECTION, SOLUTION INTRAVENOUS at 11:44

## 2024-03-23 RX ADMIN — IPRATROPIUM BROMIDE AND ALBUTEROL SULFATE 1 DOSE: 2.5; .5 SOLUTION RESPIRATORY (INHALATION) at 12:25

## 2024-03-23 RX ADMIN — DEXAMETHASONE SODIUM PHOSPHATE 10 MG: 10 INJECTION INTRAMUSCULAR; INTRAVENOUS at 11:48

## 2024-03-23 RX ADMIN — PANTOPRAZOLE SODIUM 40 MG: 40 INJECTION, POWDER, FOR SOLUTION INTRAVENOUS at 11:49

## 2024-03-23 NOTE — DISCHARGE INSTR - COC
Continuity of Care Form    Patient Name: Danie Ireland   :  1960  MRN:  [id number]    Admit date:  3/23/2024  Discharge date:  ***    Code Status Order: Prior   Advance Directives:     Admitting Physician:  No admitting provider for patient encounter.  PCP: Yu Díaz PA-C    Discharging Nurse: ***  Discharging Hospital Unit/Room#: 33  Discharging Unit Phone Number: ***    Emergency Contact:   Extended Emergency Contact Information  Primary Emergency Contact: Susana Xavier  Address: [address]  Home Phone: [phone]  Mobile Phone: [phone]  Relation: Other    Past Surgical History:  Past Surgical History:   Procedure Laterality Date    COLONOSCOPY      few years ago    FINGER SURGERY Left 25    left hand surgery - tendons    UPPER GASTROINTESTINAL ENDOSCOPY N/A 2022    EGD DILATION SAVORY performed by John Denny MD at [facility] ENDOSCOPY       Immunization History:   Immunization History   Administered Date(s) Administered    Influenza Virus Vaccine 01/10/2013, 10/11/2014    TDaP, ADACEL (age 10y-64y), BOOSTRIX (age 10y+), IM, 0.5mL 2020       Active Problems:  Patient Active Problem List   Diagnosis Code    Essential hypertension I10    Prostatitis N41.9    BPH (benign prostatic hyperplasia) N40.0    Anxiety F41.9    Herpes genitalia A60.00    Genital warts A63.0    Right groin hernia K40.90    Diverticulosis K57.90    DDD (degenerative disc disease), cervical M50.30    Laryngopharyngeal reflux K21.9    Mass of postauricular area R22.0       Isolation/Infection:   Isolation            No Isolation          Patient Infection Status       None to display                     Nurse Assessment:  Last Vital Signs: BP (!) 140/63   Pulse 60   Temp 98.2 °F (36.8 °C) (Oral)   Resp 15   Ht 1.676 m (5' 6\")   Wt 97.5 kg (215 lb)   SpO2 99%   BMI 34.70 kg/m²     Last documented pain score (0-10 scale):    Last Weight:   Wt Readings from Last

## 2024-03-24 NOTE — ED PROVIDER NOTES
..        [facility] EMERGENCY DEPARTMENT  EMERGENCY DEPARTMENT ENCOUNTER        Pt Name: Danie Ireland  MRN: [id number]  Birthdate 1960  Date of evaluation: 3/23/2024  Provider: Bennett Arreola MD  PCP: Yu Díaz PA-C  Note Started: 9:42 PM EDT 3/23/24    CHIEF COMPLAINT       Chief Complaint   Patient presents with    Shortness of Breath    Cough    Nausea       HISTORY OF PRESENT ILLNESS: 1 or more Elements   History From: Patient    Limitations to history : None    Danie Ireland is a 63 y.o. male who presents to the ED with complaints of shortness of breath, productive cough and nausea.  Patient states that he is feeling short of breath, having cough with clear sputum production for the past couple of days.  He also feels nauseous which has been going on for the past couple of days.  Patient denies any chest pain in the ED.  Patient has been saturating fine on room air in the ED.  He denies any fever, headache, abdominal pain, constipation, diarrhea or any other active complaints now in the ED.  Patient does have a history of hypertension.    Nursing Notes were all reviewed and agreed with or any disagreements were addressed in the HPI.    ROS:   Pertinent positives and negatives are stated within HPI, all other systems reviewed and are negative.    --------------------------------------------- PAST HISTORY ---------------------------------------------  Past Medical History:  has a past medical history of Anxiety, GERD (gastroesophageal reflux disease), Herpes, Hypertension, Kidney stones, and Posterior auricular mass.    Past Surgical History:  has a past surgical history that includes Finger surgery (Left, 25); Colonoscopy; and Upper gastrointestinal endoscopy (N/A, 11/28/2022).    Social History:  reports that he quit smoking about 34 years ago. His smoking use included cigarettes. He started smoking about 49 years ago. He has a 7.5 pack-year smoking history. He has

## 2024-03-25 LAB
EKG ATRIAL RATE: 56 BPM
EKG P AXIS: 37 DEGREES
EKG P-R INTERVAL: 172 MS
EKG Q-T INTERVAL: 432 MS
EKG QRS DURATION: 104 MS
EKG QTC CALCULATION (BAZETT): 416 MS
EKG R AXIS: -43 DEGREES
EKG T AXIS: 7 DEGREES
EKG VENTRICULAR RATE: 56 BPM

## 2024-03-25 PROCEDURE — 93010 ELECTROCARDIOGRAM REPORT: CPT | Performed by: INTERNAL MEDICINE

## 2024-05-07 ENCOUNTER — CLINICAL DOCUMENTATION (OUTPATIENT)
Dept: SURGERY | Age: 64
End: 2024-05-07

## 2024-05-07 NOTE — PROGRESS NOTES
MA mailed letter to patient regarding rescheduling his surgery on his ear.  Electronically signed by Yu Salas MA on 5/7/2024 at 9:47 AM

## 2024-06-28 ENCOUNTER — HOSPITAL ENCOUNTER (EMERGENCY)
Age: 64
Discharge: HOME | End: 2024-06-28
Attending: EMERGENCY MEDICINE
Payer: MEDICAID

## 2024-06-28 ENCOUNTER — APPOINTMENT (OUTPATIENT)
Dept: CT IMAGING | Age: 64
End: 2024-06-28
Payer: MEDICAID

## 2024-06-28 VITALS
WEIGHT: 199 LBS | HEART RATE: 53 BPM | RESPIRATION RATE: 16 BRPM | DIASTOLIC BLOOD PRESSURE: 68 MMHG | TEMPERATURE: 97.9 F | HEIGHT: 66 IN | OXYGEN SATURATION: 100 % | SYSTOLIC BLOOD PRESSURE: 151 MMHG | BODY MASS INDEX: 31.98 KG/M2

## 2024-06-28 DIAGNOSIS — D17.0 LIPOMA OF HEAD: Primary | ICD-10-CM

## 2024-06-28 DIAGNOSIS — R20.0 NUMBNESS: ICD-10-CM

## 2024-06-28 DIAGNOSIS — I10 ESSENTIAL HYPERTENSION: ICD-10-CM

## 2024-06-28 LAB
ALBUMIN SERPL-MCNC: 4.5 G/DL (ref 3.5–5.2)
ALP SERPL-CCNC: 72 U/L (ref 40–129)
ALT SERPL-CCNC: 24 U/L (ref 0–40)
ANION GAP SERPL CALCULATED.3IONS-SCNC: 10 MMOL/L (ref 7–16)
AST SERPL-CCNC: 24 U/L (ref 0–39)
BASOPHILS # BLD: 0.05 K/UL (ref 0–0.2)
BASOPHILS NFR BLD: 1 % (ref 0–2)
BILIRUB SERPL-MCNC: 0.7 MG/DL (ref 0–1.2)
BUN SERPL-MCNC: 16 MG/DL (ref 6–23)
CALCIUM SERPL-MCNC: 9.3 MG/DL (ref 8.6–10.2)
CHLORIDE SERPL-SCNC: 101 MMOL/L (ref 98–107)
CO2 SERPL-SCNC: 27 MMOL/L (ref 22–29)
CREAT SERPL-MCNC: 0.9 MG/DL (ref 0.7–1.2)
EKG ATRIAL RATE: 46 BPM
EKG P AXIS: 42 DEGREES
EKG P-R INTERVAL: 182 MS
EKG Q-T INTERVAL: 450 MS
EKG QRS DURATION: 112 MS
EKG QTC CALCULATION (BAZETT): 393 MS
EKG R AXIS: -60 DEGREES
EKG T AXIS: 28 DEGREES
EKG VENTRICULAR RATE: 46 BPM
EOSINOPHIL # BLD: 0.08 K/UL (ref 0.05–0.5)
EOSINOPHILS RELATIVE PERCENT: 2 % (ref 0–6)
ERYTHROCYTE [DISTWIDTH] IN BLOOD BY AUTOMATED COUNT: 12.8 % (ref 11.5–15)
GFR, ESTIMATED: >90 ML/MIN/1.73M2
GLUCOSE SERPL-MCNC: 100 MG/DL (ref 74–99)
HCT VFR BLD AUTO: 44.9 % (ref 37–54)
HGB BLD-MCNC: 15 G/DL (ref 12.5–16.5)
IMM GRANULOCYTES # BLD AUTO: <0.03 K/UL (ref 0–0.58)
IMM GRANULOCYTES NFR BLD: 0 % (ref 0–5)
LYMPHOCYTES NFR BLD: 1.46 K/UL (ref 1.5–4)
LYMPHOCYTES RELATIVE PERCENT: 27 % (ref 20–42)
MAGNESIUM SERPL-MCNC: 2.3 MG/DL (ref 1.6–2.6)
MCH RBC QN AUTO: 32.1 PG (ref 26–35)
MCHC RBC AUTO-ENTMCNC: 33.4 G/DL (ref 32–34.5)
MCV RBC AUTO: 96.1 FL (ref 80–99.9)
MONOCYTES NFR BLD: 0.54 K/UL (ref 0.1–0.95)
MONOCYTES NFR BLD: 10 % (ref 2–12)
NEUTROPHILS NFR BLD: 61 % (ref 43–80)
NEUTS SEG NFR BLD: 3.36 K/UL (ref 1.8–7.3)
PLATELET # BLD AUTO: 302 K/UL (ref 130–450)
PMV BLD AUTO: 9.7 FL (ref 7–12)
POTASSIUM SERPL-SCNC: 3.9 MMOL/L (ref 3.5–5)
PROT SERPL-MCNC: 7.9 G/DL (ref 6.4–8.3)
RBC # BLD AUTO: 4.67 M/UL (ref 3.8–5.8)
SODIUM SERPL-SCNC: 138 MMOL/L (ref 132–146)
TROPONIN I SERPL HS-MCNC: 6 NG/L (ref 0–11)
WBC OTHER # BLD: 5.5 K/UL (ref 4.5–11.5)

## 2024-06-28 PROCEDURE — 85025 COMPLETE CBC W/AUTO DIFF WBC: CPT

## 2024-06-28 PROCEDURE — 99284 EMERGENCY DEPT VISIT MOD MDM: CPT

## 2024-06-28 PROCEDURE — 83735 ASSAY OF MAGNESIUM: CPT

## 2024-06-28 PROCEDURE — 70450 CT HEAD/BRAIN W/O DYE: CPT

## 2024-06-28 PROCEDURE — 2580000003 HC RX 258: Performed by: EMERGENCY MEDICINE

## 2024-06-28 PROCEDURE — 80053 COMPREHEN METABOLIC PANEL: CPT

## 2024-06-28 PROCEDURE — 93010 ELECTROCARDIOGRAM REPORT: CPT | Performed by: INTERNAL MEDICINE

## 2024-06-28 PROCEDURE — 84484 ASSAY OF TROPONIN QUANT: CPT

## 2024-06-28 PROCEDURE — 93005 ELECTROCARDIOGRAM TRACING: CPT | Performed by: EMERGENCY MEDICINE

## 2024-06-28 RX ORDER — 0.9 % SODIUM CHLORIDE 0.9 %
1000 INTRAVENOUS SOLUTION INTRAVENOUS ONCE
Status: COMPLETED | OUTPATIENT
Start: 2024-06-28 | End: 2024-06-28

## 2024-06-28 RX ADMIN — SODIUM CHLORIDE 1000 ML: 9 INJECTION, SOLUTION INTRAVENOUS at 10:39

## 2024-06-28 ASSESSMENT — PAIN - FUNCTIONAL ASSESSMENT
PAIN_FUNCTIONAL_ASSESSMENT: NONE - DENIES PAIN
PAIN_FUNCTIONAL_ASSESSMENT: NONE - DENIES PAIN

## 2024-06-28 NOTE — ED PROVIDER NOTES
hypertensive but has no symptoms.  He has known hypertension and has blood pressure medications that he can take at home.  He states he has an appointment with his PCP on July 3, and I advised that he follow-up as scheduled.  He states that he missed 2 appointments for the lipoma with his surgeon but has the contact information at home.  He is requesting an additional referral to a surgeon as well.  I suspect his symptoms are likely due to the lipoma as the numbness is only surrounding that local area to the back of his head.  He has no focal deficits and NIH of 0.  Stroke is not suspected.  Strict ED return precautions discussed.      CONSULTS: (Who and What was discussed)  None    Social Determinants of Health : None    Chronic Conditions affecting care:    has a past medical history of Anxiety, GERD (gastroesophageal reflux disease), Herpes, Hypertension, Kidney stones, and Posterior auricular mass.     Medical Decision Making  Problems Addressed:  Essential hypertension: chronic illness or injury  Lipoma of head: chronic illness or injury  Numbness: acute illness or injury    Amount and/or Complexity of Data Reviewed  External Data Reviewed: ECG.  Labs: ordered. Decision-making details documented in ED Course.  Radiology: ordered. Decision-making details documented in ED Course.  ECG/medicine tests: ordered and independent interpretation performed. Decision-making details documented in ED Course.           ED Course as of 06/28/24 1639   Fri Jun 28, 2024   1026 EKG:  This EKG is signed and interpreted by me, Kiki Vang MD    Rate: 46  Rhythm: Sinus  Interpretation: Sinus bradycardia, normal AZ interval, normal QTc, left axis deviation, no acute ST or T wave changes  Comparison: stable as compared to patient's most recent EKG   [JA]   1157 Patient is asymptomatic on reevaluation.  He is mildly hypertensive but has no symptoms.  He has known hypertension and has blood pressure medications that he can

## 2024-06-28 NOTE — DISCHARGE INSTR - COC
Continuity of Care Form    Patient Name: Danie Ireland   :  1960  MRN:  [id number]    Admit date:  2024  Discharge date:  ***    Code Status Order: Prior   Advance Directives:     Admitting Physician:  No admitting provider for patient encounter.  PCP: Yu Díaz PA-C    Discharging Nurse: ***  Discharging Hospital Unit/Room#: SGWR/SG-WR  Discharging Unit Phone Number: ***    Emergency Contact:   Extended Emergency Contact Information  Primary Emergency Contact: Susana Xavier  Address: [place] [id number] [facility]  Home Phone: [phone]  Mobile Phone: [phone]  Relation: Other    Past Surgical History:  Past Surgical History:   Procedure Laterality Date    COLONOSCOPY      few years ago    FINGER SURGERY Left 25    left hand surgery - tendons    UPPER GASTROINTESTINAL ENDOSCOPY N/A 2022    EGD DILATION SAVORY performed by John Denny MD at [facility] ENDOSCOPY       Immunization History:   Immunization History   Administered Date(s) Administered    Influenza Virus Vaccine 01/10/2013, 10/11/2014    TDaP, ADACEL (age 10y-64y), BOOSTRIX (age 10y+), IM, 0.5mL 2020       Active Problems:  Patient Active Problem List   Diagnosis Code    Essential hypertension I10    Prostatitis N41.9    BPH (benign prostatic hyperplasia) N40.0    Anxiety F41.9    Herpes genitalia A60.00    Genital warts A63.0    Right groin hernia K40.90    Diverticulosis K57.90    DDD (degenerative disc disease), cervical M50.30    Laryngopharyngeal reflux K21.9    Mass of postauricular area R22.0       Isolation/Infection:   Isolation            No Isolation          Patient Infection Status       None to display                     Nurse Assessment:  Last Vital Signs: BP (!) 151/68   Pulse 53   Temp 97.9 °F (36.6 °C) (Oral)   Resp 16   Ht 1.676 m (5' 6\")   Wt 90.3 kg (199 lb)   SpO2 100%   BMI 32.12 kg/m²     Last documented pain score (0-10 scale):    Last Weight:   Wt Readings from

## 2024-07-02 ENCOUNTER — TELEPHONE (OUTPATIENT)
Dept: SURGERY | Age: 64
End: 2024-07-02

## 2024-07-02 ENCOUNTER — PREP FOR PROCEDURE (OUTPATIENT)
Dept: SURGERY | Age: 64
End: 2024-07-02

## 2024-07-02 DIAGNOSIS — R22.0 SWELLING, MASS, OR LUMP ON FACE: ICD-10-CM

## 2024-07-02 NOTE — TELEPHONE ENCOUNTER
Danie Ireland is scheduled for excision rt post auricular mass with Dr Lopez on 07-25-24 at [facility]. Patient needs to be NPO after midnight the night before procedure. All surgery instructions were explained to the patient and a surgery letter was also mailed out. MA informed patient that PAT will also be calling to review pre-op instructions and medications. Patient verbalized understanding.  Electronically signed by Yu Salas MA on 7/2/2024 at 6:38 AM

## 2024-07-02 NOTE — TELEPHONE ENCOUNTER
Prior Authorization Form:      DEMOGRAPHICS:                     Patient Name:  Danie Gamboa  Patient :  1960            Insurance:  Payor: [employer] / Plan: [employer] [place]AN [place] / Product Type: *No Product type* /   Insurance ID Number:    Payer/Plan Subscr  Sex Relation Sub. Ins. ID Effective Group Num   1. [facility]* DANIE GAMBOA 1960 Male Self [id number]                                    [address]         DIAGNOSIS & PROCEDURE:                       Procedure/Operation: Excision rt posterior auricular mass           CPT Code: 15049    Diagnosis:  Mass rt posterior auricular mass    ICD10 Code: R22.0    Location:  [facility]    Surgeon:  Dr Lopez    SCHEDULING INFORMATION:                          Date: 24    Time: 9:00 am              Anesthesia:  LMAC                                                       Status:  Outpatient        Special Comments:         Electronically signed by Yu Salas MA on 2024 at 6:39 AM

## 2024-07-15 ENCOUNTER — OFFICE VISIT (OUTPATIENT)
Dept: PRIMARY CARE CLINIC | Age: 64
End: 2024-07-15
Payer: MEDICAID

## 2024-07-15 VITALS
HEART RATE: 59 BPM | HEIGHT: 66 IN | OXYGEN SATURATION: 96 % | DIASTOLIC BLOOD PRESSURE: 70 MMHG | RESPIRATION RATE: 16 BRPM | WEIGHT: 207 LBS | TEMPERATURE: 97.5 F | BODY MASS INDEX: 33.27 KG/M2 | SYSTOLIC BLOOD PRESSURE: 138 MMHG

## 2024-07-15 DIAGNOSIS — R10.9 FLANK PAIN: ICD-10-CM

## 2024-07-15 DIAGNOSIS — M48.061 DEGENERATIVE LUMBAR SPINAL STENOSIS: Primary | ICD-10-CM

## 2024-07-15 DIAGNOSIS — M50.30 DDD (DEGENERATIVE DISC DISEASE), CERVICAL: ICD-10-CM

## 2024-07-15 LAB
BILIRUBIN, POC: NORMAL
BLOOD URINE, POC: NORMAL
CLARITY, POC: CLEAR
COLOR, POC: YELLOW
GLUCOSE URINE, POC: NORMAL
KETONES, POC: NORMAL
LEUKOCYTE EST, POC: NORMAL
NITRITE, POC: NORMAL
PH, POC: 7
PROTEIN, POC: NORMAL
SPECIFIC GRAVITY, POC: 1.02
UROBILINOGEN, POC: 1

## 2024-07-15 PROCEDURE — G8417 CALC BMI ABV UP PARAM F/U: HCPCS | Performed by: PHYSICIAN ASSISTANT

## 2024-07-15 PROCEDURE — 3017F COLORECTAL CA SCREEN DOC REV: CPT | Performed by: PHYSICIAN ASSISTANT

## 2024-07-15 PROCEDURE — 81002 URINALYSIS NONAUTO W/O SCOPE: CPT | Performed by: PHYSICIAN ASSISTANT

## 2024-07-15 PROCEDURE — 99214 OFFICE O/P EST MOD 30 MIN: CPT | Performed by: PHYSICIAN ASSISTANT

## 2024-07-15 PROCEDURE — G8427 DOCREV CUR MEDS BY ELIG CLIN: HCPCS | Performed by: PHYSICIAN ASSISTANT

## 2024-07-15 PROCEDURE — 3075F SYST BP GE 130 - 139MM HG: CPT | Performed by: PHYSICIAN ASSISTANT

## 2024-07-15 PROCEDURE — 1036F TOBACCO NON-USER: CPT | Performed by: PHYSICIAN ASSISTANT

## 2024-07-15 PROCEDURE — 1123F ACP DISCUSS/DSCN MKR DOCD: CPT | Performed by: PHYSICIAN ASSISTANT

## 2024-07-15 PROCEDURE — 3078F DIAST BP <80 MM HG: CPT | Performed by: PHYSICIAN ASSISTANT

## 2024-07-15 RX ORDER — GABAPENTIN 100 MG/1
100 CAPSULE ORAL 2 TIMES DAILY
Qty: 60 CAPSULE | Refills: 1 | Status: SHIPPED | OUTPATIENT
Start: 2024-07-15 | End: 2024-09-13

## 2024-07-15 NOTE — PROGRESS NOTES
97.5 °F (36.4 °C)   SpO2: 96%   Weight: 93.9 kg (207 lb)   Height: 1.676 m (5' 5.98\")       Physical Exam  Constitutional:       General: He is not in acute distress.     Appearance: Normal appearance. He is well-developed.   HENT:      Head: Normocephalic and atraumatic.      Right Ear: External ear normal.      Left Ear: External ear normal.      Nose: Nose normal.   Eyes:      General: No scleral icterus.     Conjunctiva/sclera: Conjunctivae normal.      Pupils: Pupils are equal, round, and reactive to light.   Neck:      Thyroid: No thyromegaly.   Cardiovascular:      Rate and Rhythm: Normal rate and regular rhythm.      Heart sounds: Normal heart sounds. No murmur heard.  Pulmonary:      Effort: Pulmonary effort is normal. No accessory muscle usage or respiratory distress.      Breath sounds: Normal breath sounds. No wheezing.   Musculoskeletal:         General: Normal range of motion.      Cervical back: Normal range of motion and neck supple.   Skin:     General: Skin is warm and dry.      Findings: No rash.   Neurological:      Mental Status: He is alert and oriented to person, place, and time.      Deep Tendon Reflexes: Reflexes are normal and symmetric.   Psychiatric:         Mood and Affect: Mood and affect normal.         Speech: Speech normal.         Behavior: Behavior normal.         Assessment/Plan:      Danie was seen today for hypertension, foot pain and flank pain.    Diagnoses and all orders for this visit:    Degenerative lumbar spinal stenosis  -     gabapentin (NEURONTIN) 100 MG capsule; Take 1 capsule by mouth 2 times daily for 60 days. Intended supply: 90 days    DDD (degenerative disc disease), cervical  -     gabapentin (NEURONTIN) 100 MG capsule; Take 1 capsule by mouth 2 times daily for 60 days. Intended supply: 90 days    Flank pain  -     POCT Urinalysis no Micro      As above.  Call or go to ED immediately if symptoms worsen or persist.  Return in about 3 months (around 10/15/2024) for

## 2024-07-22 NOTE — PROGRESS NOTES
[facility] PRE-ADMISSION TESTING INSTRUCTIONS    The Preadmission Testing patient is instructed accordingly using the following criteria (check applicable):    ARRIVAL INSTRUCTIONS:  [x] Parking the day of Surgery is located in the Main Entrance lot.  Upon entering the door, make an immediate right to the surgery reception desk    [x] Bring photo ID and insurance card    [] Bring in a copy of Living will or Durable Power of  papers.    [x] Please be sure to arrange for responsible adult to provide transportation to and from the hospital    [x] Please arrange for responsible adult to be with you for the 24 hour period post procedure due to having anesthesia    [x] If you awake am of surgery not feeling well or have temperature >100 please call [phone]    GENERAL INSTRUCTIONS:    [x] May have clear liquids until 4 hours prior to surgery. Examples include water, fruit juices (no pulp), jello, popsicles, black coffee or tea, beef or chicken broth.              No gum, candy or mints.    [x] You may brush your teeth, but do not swallow any water    [x] Take medications as instructed with 1-2 oz of water    [x] Stop herbal supplements and vitamins 5 days prior to procedure    [] Follow preop dosing of blood thinners per physician instructions    [] Take 1/2 dose of evening insulin, but no insulin after midnight    [] No oral diabetic medications after midnight    [] If diabetic and have low blood sugar or feel symptomatic, take 1-2oz apple juice only    [] Bring inhalers day of surgery    [] Bring C-PAP/ Bi-Pap day of surgery    [] Bring urine specimen day of surgery    [x] Shower or bath with soap, lather and rinse well, AM of Surgery, no lotion, powders or creams to surgical site    [] Follow bowel prep as instructed per surgeon    [x] No tobacco products within 24 hours of surgery     [x] No alcohol or illegal drug use within 24 hours of surgery.    [x] Jewelry, body piercing's,  eyeglasses, contact lenses and dentures are not permitted into surgery (bring cases)      [] Please do not wear any nail polish, make up or hair products on the day of surgery    [x] You can expect a call the business day prior to procedure to notify you if your arrival time changes    [x] If you receive a survey after surgery we would greatly appreciate your comments    [] Parent/guardian of a minor must accompany their child and remain on the premises  the entire time they are under our care     [] Pediatric patients may bring favorite toy, blanket or comfort item with them    [] A caregiver or family member must remain with the patient during their stay if they are mentally handicapped, have dementia, disoriented or unable to use a call light or would be a safety concern if left unattended    [x] Please notify surgeon if you develop any illness between now and time of surgery (cold, cough, sore throat, fever, nausea, vomiting) or any signs of infections  including skin, wounds, and dental.    [x]  The Outpatient Pharmacy is available to fill your prescription here on your day of surgery, ask your preop nurse for details    [] Other instructions    EDUCATIONAL MATERIALS PROVIDED:    [] PAT Preoperative Education Packet/Booklet     [] Medication List    [] Transfusion bracelet applied with instructions    [] Shower with soap, lather and rinse well, and use CHG wipes provided the evening before surgery as instructed    [] Incentive spirometer with instructions

## 2024-07-24 ENCOUNTER — ANESTHESIA EVENT (OUTPATIENT)
Dept: OPERATING ROOM | Age: 64
End: 2024-07-24
Payer: MEDICAID

## 2024-07-25 ENCOUNTER — ANESTHESIA (OUTPATIENT)
Dept: OPERATING ROOM | Age: 64
End: 2024-07-25
Payer: MEDICAID

## 2024-07-25 ENCOUNTER — HOSPITAL ENCOUNTER (OUTPATIENT)
Age: 64
Setting detail: OUTPATIENT SURGERY
Discharge: HOME | End: 2024-07-25
Attending: SURGERY | Admitting: SURGERY
Payer: MEDICAID

## 2024-07-25 VITALS
HEART RATE: 52 BPM | OXYGEN SATURATION: 97 % | WEIGHT: 200 LBS | HEIGHT: 66 IN | TEMPERATURE: 98.1 F | DIASTOLIC BLOOD PRESSURE: 62 MMHG | RESPIRATION RATE: 18 BRPM | SYSTOLIC BLOOD PRESSURE: 125 MMHG | BODY MASS INDEX: 32.14 KG/M2

## 2024-07-25 DIAGNOSIS — G89.18 POSTOPERATIVE PAIN: Primary | ICD-10-CM

## 2024-07-25 DIAGNOSIS — R22.0 SWELLING, MASS, OR LUMP ON FACE: ICD-10-CM

## 2024-07-25 PROCEDURE — 3700000000 HC ANESTHESIA ATTENDED CARE: Performed by: SURGERY

## 2024-07-25 PROCEDURE — 2500000003 HC RX 250 WO HCPCS: Performed by: SURGERY

## 2024-07-25 PROCEDURE — 3700000001 HC ADD 15 MINUTES (ANESTHESIA): Performed by: SURGERY

## 2024-07-25 PROCEDURE — 88304 TISSUE EXAM BY PATHOLOGIST: CPT

## 2024-07-25 PROCEDURE — 7100000010 HC PHASE II RECOVERY - FIRST 15 MIN: Performed by: SURGERY

## 2024-07-25 PROCEDURE — 2500000003 HC RX 250 WO HCPCS

## 2024-07-25 PROCEDURE — 7100000011 HC PHASE II RECOVERY - ADDTL 15 MIN: Performed by: SURGERY

## 2024-07-25 PROCEDURE — 2580000003 HC RX 258

## 2024-07-25 PROCEDURE — 21012 EXC FACE LES SBQ 2 CM/>: CPT | Performed by: SURGERY

## 2024-07-25 PROCEDURE — 6360000002 HC RX W HCPCS

## 2024-07-25 PROCEDURE — 2580000003 HC RX 258: Performed by: SURGERY

## 2024-07-25 PROCEDURE — 2709999900 HC NON-CHARGEABLE SUPPLY: Performed by: SURGERY

## 2024-07-25 PROCEDURE — 3600000002 HC SURGERY LEVEL 2 BASE: Performed by: SURGERY

## 2024-07-25 PROCEDURE — 6360000002 HC RX W HCPCS: Performed by: SURGERY

## 2024-07-25 PROCEDURE — 3600000012 HC SURGERY LEVEL 2 ADDTL 15MIN: Performed by: SURGERY

## 2024-07-25 RX ORDER — DEXMEDETOMIDINE HYDROCHLORIDE 100 UG/ML
INJECTION, SOLUTION INTRAVENOUS PRN
Status: DISCONTINUED | OUTPATIENT
Start: 2024-07-25 | End: 2024-07-25 | Stop reason: SDUPTHER

## 2024-07-25 RX ORDER — BUPIVACAINE HYDROCHLORIDE AND EPINEPHRINE 2.5; 5 MG/ML; UG/ML
INJECTION, SOLUTION EPIDURAL; INFILTRATION; INTRACAUDAL; PERINEURAL PRN
Status: DISCONTINUED | OUTPATIENT
Start: 2024-07-25 | End: 2024-07-25 | Stop reason: ALTCHOICE

## 2024-07-25 RX ORDER — LIDOCAINE HYDROCHLORIDE 20 MG/ML
INJECTION, SOLUTION EPIDURAL; INFILTRATION; INTRACAUDAL; PERINEURAL PRN
Status: DISCONTINUED | OUTPATIENT
Start: 2024-07-25 | End: 2024-07-25 | Stop reason: SDUPTHER

## 2024-07-25 RX ORDER — SODIUM CHLORIDE 9 MG/ML
INJECTION, SOLUTION INTRAVENOUS CONTINUOUS PRN
Status: DISCONTINUED | OUTPATIENT
Start: 2024-07-25 | End: 2024-07-25 | Stop reason: SDUPTHER

## 2024-07-25 RX ORDER — HYDROCODONE BITARTRATE AND ACETAMINOPHEN 5; 325 MG/1; MG/1
1 TABLET ORAL EVERY 4 HOURS PRN
Qty: 30 TABLET | Refills: 0 | Status: SHIPPED | OUTPATIENT
Start: 2024-07-25 | End: 2024-07-30

## 2024-07-25 RX ORDER — MIDAZOLAM HYDROCHLORIDE 1 MG/ML
INJECTION INTRAMUSCULAR; INTRAVENOUS PRN
Status: DISCONTINUED | OUTPATIENT
Start: 2024-07-25 | End: 2024-07-25 | Stop reason: SDUPTHER

## 2024-07-25 RX ORDER — FENTANYL CITRATE 50 UG/ML
INJECTION, SOLUTION INTRAMUSCULAR; INTRAVENOUS PRN
Status: DISCONTINUED | OUTPATIENT
Start: 2024-07-25 | End: 2024-07-25 | Stop reason: SDUPTHER

## 2024-07-25 RX ORDER — PROPOFOL 10 MG/ML
INJECTION, EMULSION INTRAVENOUS PRN
Status: DISCONTINUED | OUTPATIENT
Start: 2024-07-25 | End: 2024-07-25 | Stop reason: SDUPTHER

## 2024-07-25 RX ADMIN — LIDOCAINE HYDROCHLORIDE 40 MG: 20 INJECTION, SOLUTION EPIDURAL; INFILTRATION; INTRACAUDAL; PERINEURAL at 08:30

## 2024-07-25 RX ADMIN — DEXMEDETOMIDINE 8 MCG: 100 INJECTION, SOLUTION INTRAVENOUS at 08:30

## 2024-07-25 RX ADMIN — SODIUM CHLORIDE: 9 INJECTION, SOLUTION INTRAVENOUS at 08:20

## 2024-07-25 RX ADMIN — FENTANYL CITRATE 50 MCG: 50 INJECTION, SOLUTION INTRAMUSCULAR; INTRAVENOUS at 08:38

## 2024-07-25 RX ADMIN — PROPOFOL 50 MG: 10 INJECTION, EMULSION INTRAVENOUS at 08:30

## 2024-07-25 RX ADMIN — WATER 2000 MG: 1 INJECTION INTRAMUSCULAR; INTRAVENOUS; SUBCUTANEOUS at 08:29

## 2024-07-25 RX ADMIN — MIDAZOLAM 2 MG: 1 INJECTION INTRAMUSCULAR; INTRAVENOUS at 08:23

## 2024-07-25 RX ADMIN — FENTANYL CITRATE 50 MCG: 50 INJECTION, SOLUTION INTRAMUSCULAR; INTRAVENOUS at 08:30

## 2024-07-25 RX ADMIN — PROPOFOL 100 MCG/KG/MIN: 10 INJECTION, EMULSION INTRAVENOUS at 08:33

## 2024-07-25 ASSESSMENT — PAIN SCALES - GENERAL: PAINLEVEL_OUTOF10: 0

## 2024-07-25 ASSESSMENT — PAIN - FUNCTIONAL ASSESSMENT
PAIN_FUNCTIONAL_ASSESSMENT: 0-10

## 2024-07-25 ASSESSMENT — LIFESTYLE VARIABLES: SMOKING_STATUS: 0

## 2024-07-25 NOTE — H&P
[facility] Surgery Clinic Note     Assessment/Plan:        Diagnosis Orders   1. Mass of right postauricular area        we will plan for excision.                Return for Surgery.             Chief Complaint   Patient presents with    New Patient       Lipoma on neck - right side          PCP: Yu Díaz PA-C     HPI: Danie Ireland is a 63 y.o. male who presents in consultation for right postauricular mass.  He has not had it for over 3 years.  He causes some discomfort.  He had ultrasound in 2021 which per the report shows a probable lipoma.  It has not gotten much bigger he reports.  There is no drainage from the lesion.         Past Medical History        Past Medical History:   Diagnosis Date    Anxiety      GERD (gastroesophageal reflux disease)      Herpes      Hypertension      Kidney stones              Past Surgical History         Past Surgical History:   Procedure Laterality Date    COLONOSCOPY         few years ago    FINGER SURGERY Left 25     left hand surgery - tendons    UPPER GASTROINTESTINAL ENDOSCOPY N/A 11/28/2022     EGD DILATION SAVORY performed by John Denny MD at [facility] ENDOSCOPY            Home Medications           Prior to Admission medications    Medication Sig Start Date End Date Taking? Authorizing Provider   vitamin C (ASCORBIC ACID) 500 MG tablet Take 1 tablet by mouth daily     Yes Manny Badillo MD   Cholecalciferol (VITAMIN D3) 1.25 MG (57664 UT) CAPS Take by mouth     Yes Manny Badillo MD   zinc gluconate 50 MG tablet Take 1 tablet by mouth daily     Yes Manny Badillo MD   losartan-hydroCHLOROthiazide (HYZAAR) 50-12.5 MG per tablet Take 1 tablet by mouth daily 12/27/23   Yes Mario Nur, DO   ALBUTEROL IN Inhale into the lungs  Patient not taking: Reported on 1/12/2024       Manny Badillo MD                  Allergies   Allergen Reactions    Codeine Other (See Comments)       irritability         Social History   Social History             Socioeconomic History    Marital status: Legally    Tobacco Use    Smoking status: Former       Current packs/day: 0.00       Average packs/day: 0.5 packs/day for 15.0 years (7.5 ttl pk-yrs)       Types: Cigarettes       Start date: 1975       Quit date: 1990       Years since quittin.1       Passive exposure: Past    Smokeless tobacco: Never   Vaping Use    Vaping Use: Never used   Substance and Sexual Activity    Alcohol use: Not Currently       Alcohol/week: 2.0 standard drinks of alcohol       Types: 2 Cans of beer per week    Drug use: No      Social Determinants of Health           Financial Resource Strain: Low Risk  (2024)     Overall Financial Resource Strain (CARDIA)      Difficulty of Paying Living Expenses: Not hard at all   Food Insecurity: No Food Insecurity (2024)     Hunger Vital Sign      Worried About Running Out of Food in the Last Year: Never true      Ran Out of Food in the Last Year: Never true   Transportation Needs: Unknown (2024)     PRAPARE - Transportation      Lack of Transportation (Non-Medical): No   Housing Stability: Unknown (2024)     Housing Stability Vital Sign      Unstable Housing in the Last Year: No            Family History         Family History   Problem Relation Age of Onset    Cancer Mother 70         ovarian    Depression Sister      Substance Abuse Brother      No Known Problems Sister      No Known Problems Sister      No Known Problems Sister      No Known Problems Sister      No Known Problems Brother      Alcohol Abuse Son      Substance Abuse Son              Review of Systems   All other systems reviewed and are negative.                  Objective:  Vitals       Vitals:     24 1404   BP: (!) 149/61   Site: Right Upper Arm   Position: Sitting   Cuff Size: Medium Adult   Pulse: 63   Temp: 98.9 °F (37.2 °C)   TempSrc: Temporal   SpO2: 98%   Weight: 93.8 kg (206 lb 12.8 oz)   Height: 1.651 m (5' 5\")         Body  mass index is 34.41 kg/m².        Physical Exam  Constitutional:       General: He is not in acute distress.     Appearance: He is not diaphoretic.   HENT:      Head: Normocephalic and atraumatic.     Eyes:      General:         Right eye: No discharge.         Left eye: No discharge.   Neck:      Trachea: No tracheal deviation.   Cardiovascular:      Rate and Rhythm: Normal rate.   Pulmonary:      Effort: Pulmonary effort is normal. No respiratory distress.   Abdominal:      General: There is no distension.      Palpations: Abdomen is soft.      Tenderness: There is no guarding or rebound.   Skin:     General: Skin is warm and dry.   Neurological:      Mental Status: He is alert and oriented to person, place, and time.            CBC:         Lab Results   Component Value Date/Time     WBC 7.5 01/12/2024 02:03 PM     RBC 4.60 01/12/2024 02:03 PM     HGB 14.6 01/12/2024 02:03 PM     HCT 44.3 01/12/2024 02:03 PM     MCV 96.3 01/12/2024 02:03 PM     MCH 31.7 01/12/2024 02:03 PM     MCHC 33.0 01/12/2024 02:03 PM     RDW 12.5 01/12/2024 02:03 PM      01/12/2024 02:03 PM     MPV 10.1 01/12/2024 02:03 PM      CMP:          Lab Results   Component Value Date/Time      01/12/2024 02:03 PM     K 3.8 01/12/2024 02:03 PM      01/12/2024 02:03 PM     CO2 24 01/12/2024 02:03 PM     BUN 15 01/12/2024 02:03 PM     CREATININE 0.9 01/12/2024 02:03 PM     GFRAA >60 10/18/2021 04:29 PM     LABGLOM >60 01/12/2024 02:03 PM     GLUCOSE 92 01/12/2024 02:03 PM     PROT 7.7 01/12/2024 02:03 PM     LABALBU 4.3 01/12/2024 02:03 PM     CALCIUM 9.3 01/12/2024 02:03 PM     BILITOT 0.8 01/12/2024 02:03 PM     ALKPHOS 77 01/12/2024 02:03 PM     AST 22 01/12/2024 02:03 PM     ALT 35 01/12/2024 02:03 PM      PT/INR:  No results found for: \"PROTIME\", \"INR\"  HgBA1c:          Lab Results   Component Value Date/Time     LABA1C 5.9 01/12/2024 02:03 PM      LIPASE:          Lab Results   Component Value Date/Time     LIPASE 22

## 2024-07-25 NOTE — ANESTHESIA POSTPROCEDURE EVALUATION
Department of Anesthesiology  Postprocedure Note    Patient: Danie Ireland  MRN: [id number]  YOB: 1960  Date of evaluation: 7/25/2024    Procedure Summary       Date: 07/25/24 Room / Location: [address]    Anesthesia Start: 0820 Anesthesia Stop: 0908    Procedure: EXCISION RIGHT POST AURICULAR MASS (Right: Ear) Diagnosis:       Swelling, mass, or lump on face      (Swelling, mass, or lump on face [R22.0])    Surgeons: Sereg Lopez MD Responsible Provider: Sarahi Andres DO    Anesthesia Type: MAC ASA Status: 2            Anesthesia Type: MAC    Annmarie Phase I: Annmarie Score: 10    Annmarie Phase II: Annmarie Score: 10    Anesthesia Post Evaluation    Patient location during evaluation: PACU  Patient participation: complete - patient participated  Level of consciousness: awake and alert  Airway patency: patent  Nausea & Vomiting: no nausea and no vomiting  Cardiovascular status: hemodynamically stable  Respiratory status: acceptable  Hydration status: euvolemic  Pain management: adequate    No notable events documented.

## 2024-07-25 NOTE — ANESTHESIA PRE PROCEDURE
Department of Anesthesiology  Preprocedure Note       Name:  Danie Ireland   Age:  64 y.o.  :  1960                                          MRN:  [id number]         Date:  2024      Surgeon: Surgeon(s):  Serge Lopez MD    Procedure: Procedure(s):  EXCISION RIGHT POST AURICULAR MASS    Medications prior to admission:   Prior to Admission medications    Medication Sig Start Date End Date Taking? Authorizing Provider   gabapentin (NEURONTIN) 100 MG capsule Take 1 capsule by mouth 2 times daily for 60 days. Intended supply: 90 days 7/15/24 9/13/24  Yu Díaz PA-C   losartan-hydroCHLOROthiazide (HYZAAR) 50-12.5 MG per tablet Take 1 tablet by mouth daily 24   Yu Díaz PA-C   vitamin C (ASCORBIC ACID) 500 MG tablet Take 1 tablet by mouth daily    ProviderManny MD   zinc gluconate 50 MG tablet Take 1 tablet by mouth daily    ProviderManny MD       Current medications:    Current Facility-Administered Medications   Medication Dose Route Frequency Provider Last Rate Last Admin    ceFAZolin (ANCEF) 2,000 mg in sterile water 20 mL IV syringe  2,000 mg IntraVENous On Call to OR Serge Lopez MD           Allergies:    Allergies   Allergen Reactions    Codeine Other (See Comments)     irritability       Problem List:    Patient Active Problem List   Diagnosis Code    Essential hypertension I10    Prostatitis N41.9    BPH (benign prostatic hyperplasia) N40.0    Anxiety F41.9    Herpes genitalia A60.00    Genital warts A63.0    Right groin hernia K40.90    Diverticulosis K57.90    DDD (degenerative disc disease), cervical M50.30    Laryngopharyngeal reflux K21.9    Mass of postauricular area R22.0    Swelling, mass, or lump on face R22.0       Past Medical History:        Diagnosis Date    Anxiety     Arthritis     GERD (gastroesophageal reflux disease)     Herpes     Hypertension     Posterior auricular mass        Past Surgical History:        Procedure Laterality Date

## 2024-07-25 NOTE — OP NOTE
Operative Note    Danie Ireland  YOB: 1960  [id number]    Pre-operative Diagnosis: right postauricular mass    Post-operative Diagnosis: same    Procedure(s):  EXCISION RIGHT POST AURICULAR MASS    * No implants in log *      Surgeon:   Primary: Serge Lopez MD    Staff:  Scrub Person First: Shaun Yuan    Anesthesia:   Monitor Anesthesia Care    Estimated Blood Loss: less than 50     Complications: None    Specimens:   ID Type Source Tests Collected by Time Destination   A : RIGHT POST AURICULAR MASS Tissue Tissue SURGICAL PATHOLOGY Serge Lopez MD 7/25/2024 0851        Findings: 3.3 x 2.3 cm lipomatous postauricular mass.      Indications: Patient is a 64 y.o. male who was diagnosed with right postauricular mass. Risks/Benefits/Alternatives were discussed with the patient, including bleeding, infection, or. The patient agreed to undergo the procedure and informed consent was obtained.    Procedure: After informed consent, the patient was brought to the operating room and placed left lateral positioning.  MAC anesthesia was then induced which the patient tolerated well. Time out was performed to identify the correct patient and procedure. They received appropriate perioperative antibiotics. The right head and neck was prepped and draped in the usual sterile fashion.  Local anesthetic was overtreated over the lesion.  Incision was made and carried down to the subcutaneous tissue.  Lipomatous mass was encountered and dissected out.  It was removed and sent for specimen.  The wound was irrigated.  Hemostasis was ensured.  The wound was closed with 3-0 and 4-0 Vicryl and Monocryl suture respectively.  Skin glue was applied    Counts were correct at the end of the case.    Tolerated procedure well    I was present and scrubbed for the procedure.    Serge Lopez MD  07/25/24  8:59 AM

## 2024-07-31 DIAGNOSIS — I10 ESSENTIAL HYPERTENSION: ICD-10-CM

## 2024-07-31 RX ORDER — LOSARTAN POTASSIUM AND HYDROCHLOROTHIAZIDE 12.5; 5 MG/1; MG/1
1 TABLET ORAL DAILY
Qty: 90 TABLET | Refills: 1 | Status: SHIPPED | OUTPATIENT
Start: 2024-07-31

## 2024-08-02 ENCOUNTER — OFFICE VISIT (OUTPATIENT)
Dept: PRIMARY CARE CLINIC | Age: 64
End: 2024-08-02
Payer: MEDICAID

## 2024-08-02 VITALS
TEMPERATURE: 97 F | BODY MASS INDEX: 35 KG/M2 | DIASTOLIC BLOOD PRESSURE: 68 MMHG | HEIGHT: 64 IN | OXYGEN SATURATION: 96 % | WEIGHT: 205 LBS | SYSTOLIC BLOOD PRESSURE: 138 MMHG | HEART RATE: 55 BPM | RESPIRATION RATE: 17 BRPM

## 2024-08-02 DIAGNOSIS — M47.812 ARTHROPATHY OF CERVICAL FACET JOINT: Primary | ICD-10-CM

## 2024-08-02 DIAGNOSIS — R20.2 PARESTHESIA OF SKIN: ICD-10-CM

## 2024-08-02 DIAGNOSIS — Z78.9 PRESENCE OF SURGICAL INCISION: ICD-10-CM

## 2024-08-02 PROCEDURE — 1036F TOBACCO NON-USER: CPT | Performed by: PHYSICIAN ASSISTANT

## 2024-08-02 PROCEDURE — G8417 CALC BMI ABV UP PARAM F/U: HCPCS | Performed by: PHYSICIAN ASSISTANT

## 2024-08-02 PROCEDURE — 99214 OFFICE O/P EST MOD 30 MIN: CPT | Performed by: PHYSICIAN ASSISTANT

## 2024-08-02 PROCEDURE — 1123F ACP DISCUSS/DSCN MKR DOCD: CPT | Performed by: PHYSICIAN ASSISTANT

## 2024-08-02 PROCEDURE — 3075F SYST BP GE 130 - 139MM HG: CPT | Performed by: PHYSICIAN ASSISTANT

## 2024-08-02 PROCEDURE — G8427 DOCREV CUR MEDS BY ELIG CLIN: HCPCS | Performed by: PHYSICIAN ASSISTANT

## 2024-08-02 PROCEDURE — 3017F COLORECTAL CA SCREEN DOC REV: CPT | Performed by: PHYSICIAN ASSISTANT

## 2024-08-02 PROCEDURE — 3078F DIAST BP <80 MM HG: CPT | Performed by: PHYSICIAN ASSISTANT

## 2024-08-02 NOTE — PROGRESS NOTES
abdominal pain, no nausea, no vomiting, no constipation, no diarrhea, no heartburn  Musculoskeletal:  No joint pain, no leg cramps, + back pain, no muscle aches  Respiratory:  No shortness of breath, no orthopnea, no wheezing, no TOMLINSON, no hemoptysis  Urology:  No blood in the urine, no urinary frequency, no urinary incontinence, no urinary urgency, no nocturia, no dysuria    Vitals:    08/02/24 1203   BP: 138/68   Pulse: 55   Resp: 17   Temp: 97 °F (36.1 °C)   SpO2: 96%   Weight: 93 kg (205 lb)   Height: 1.626 m (5' 4.02\")       Physical Exam  Constitutional:       General: He is not in acute distress.     Appearance: Normal appearance. He is well-developed.   HENT:      Head: Normocephalic and atraumatic.      Right Ear: External ear normal.      Left Ear: External ear normal.      Nose: Nose normal.   Eyes:      General: No scleral icterus.     Conjunctiva/sclera: Conjunctivae normal.      Pupils: Pupils are equal, round, and reactive to light.   Neck:      Thyroid: No thyromegaly.      Comments: Surgical incsion with mild edema, no erythema or discharge  Cardiovascular:      Rate and Rhythm: Normal rate and regular rhythm.      Heart sounds: Normal heart sounds. No murmur heard.  Pulmonary:      Effort: Pulmonary effort is normal. No accessory muscle usage or respiratory distress.      Breath sounds: Normal breath sounds. No wheezing.   Musculoskeletal:         General: Normal range of motion.      Cervical back: Normal range of motion and neck supple. No tenderness.   Lymphadenopathy:      Cervical: No cervical adenopathy.   Skin:     General: Skin is warm and dry.      Findings: No rash.   Neurological:      Mental Status: He is alert and oriented to person, place, and time.      Deep Tendon Reflexes: Reflexes are normal and symmetric.   Psychiatric:         Mood and Affect: Mood and affect normal.         Speech: Speech normal.         Behavior: Behavior normal.         Assessment/Plan:      Danie was seen today

## 2024-08-05 LAB — SURGICAL PATHOLOGY REPORT: NORMAL

## 2024-10-24 DIAGNOSIS — M48.061 DEGENERATIVE LUMBAR SPINAL STENOSIS: ICD-10-CM

## 2024-10-24 DIAGNOSIS — M50.30 DDD (DEGENERATIVE DISC DISEASE), CERVICAL: ICD-10-CM

## 2024-10-24 NOTE — TELEPHONE ENCOUNTER
Name of Medication(s) Requested:  Requested Prescriptions     Pending Prescriptions Disp Refills    gabapentin (NEURONTIN) 100 MG capsule [Pharmacy Med Name: GABAPENTIN 100MG CAPSULES] 60 capsule 0     Sig: Take 1 capsule by mouth 2 times daily for 30 days.       Medication is on current medication list Yes    Dosage and directions were verified? Yes    Quantity verified: 30 day supply     Pharmacy Verified?  Yes    Last Appointment:  8/2/2024    Future appts:  Future Appointments   Date Time Provider Department Center   11/4/2024  4:15 PM Yu Díaz PA-C [facility] E[facility] DEP        (If no appt send self scheduling link. .REFILLAPPT)  Scheduling request sent?     [] Yes  [] No    Does patient need updated?  [] Yes  [] No

## 2024-10-25 RX ORDER — GABAPENTIN 100 MG/1
100 CAPSULE ORAL 2 TIMES DAILY
Qty: 60 CAPSULE | Refills: 0 | Status: SHIPPED | OUTPATIENT
Start: 2024-10-25 | End: 2024-11-24

## 2024-10-31 DIAGNOSIS — I10 ESSENTIAL HYPERTENSION: ICD-10-CM

## 2024-10-31 RX ORDER — LOSARTAN POTASSIUM AND HYDROCHLOROTHIAZIDE 12.5; 5 MG/1; MG/1
1 TABLET ORAL DAILY
Qty: 30 TABLET | Refills: 0 | Status: SHIPPED | OUTPATIENT
Start: 2024-10-31

## 2024-10-31 NOTE — TELEPHONE ENCOUNTER
Name of Medication(s) Requested:  Requested Prescriptions     Pending Prescriptions Disp Refills    losartan-hydroCHLOROthiazide (HYZAAR) 50-12.5 MG per tablet 30 tablet 0     Sig: Take 1 tablet by mouth daily       Medication is on current medication list Yes    Dosage and directions were verified? Yes    Quantity verified: 30 day supply     Pharmacy Verified?  Yes    Last Appointment:  8/2/2024    Future appts:  Future Appointments   Date Time Provider Department Center   11/4/2024  4:15 PM Yu Díaz PA-C TRA[facility] ECC DEP        (If no appt send self scheduling link. .REFILLAPPT)  Scheduling request sent?     [] Yes  [] No    Does patient need updated?  [] Yes  [] No

## 2024-11-04 ENCOUNTER — OFFICE VISIT (OUTPATIENT)
Dept: PRIMARY CARE CLINIC | Age: 64
End: 2024-11-04

## 2024-11-04 VITALS
HEART RATE: 60 BPM | HEIGHT: 64 IN | TEMPERATURE: 97 F | SYSTOLIC BLOOD PRESSURE: 128 MMHG | BODY MASS INDEX: 34.66 KG/M2 | DIASTOLIC BLOOD PRESSURE: 70 MMHG | RESPIRATION RATE: 17 BRPM | WEIGHT: 203 LBS | OXYGEN SATURATION: 98 %

## 2024-11-04 DIAGNOSIS — R35.1 NOCTURIA: ICD-10-CM

## 2024-11-04 DIAGNOSIS — R31.1 BENIGN ESSENTIAL MICROSCOPIC HEMATURIA: ICD-10-CM

## 2024-11-04 DIAGNOSIS — N40.1 BENIGN PROSTATIC HYPERPLASIA WITH URINARY FREQUENCY: ICD-10-CM

## 2024-11-04 DIAGNOSIS — Z00.01 ENCOUNTER FOR WELL ADULT EXAM WITH ABNORMAL FINDINGS: Primary | ICD-10-CM

## 2024-11-04 DIAGNOSIS — I10 ESSENTIAL HYPERTENSION: ICD-10-CM

## 2024-11-04 DIAGNOSIS — R35.0 BENIGN PROSTATIC HYPERPLASIA WITH URINARY FREQUENCY: ICD-10-CM

## 2024-11-04 DIAGNOSIS — R10.9 RIGHT FLANK PAIN: ICD-10-CM

## 2024-11-04 PROBLEM — R22.0 SWELLING, MASS, OR LUMP ON FACE: Status: RESOLVED | Noted: 2024-07-02 | Resolved: 2024-11-04

## 2024-11-04 LAB
ANION GAP SERPL CALCULATED.3IONS-SCNC: 14 MMOL/L (ref 7–16)
BASOPHILS ABSOLUTE: 0.07 K/UL (ref 0–0.2)
BASOPHILS RELATIVE PERCENT: 1 % (ref 0–2)
BILIRUBIN, POC: NORMAL
BLOOD URINE, POC: NORMAL
BUN BLDV-MCNC: 17 MG/DL (ref 6–23)
CALCIUM SERPL-MCNC: 9.3 MG/DL (ref 8.6–10.2)
CHLORIDE BLD-SCNC: 103 MMOL/L (ref 98–107)
CHOLESTEROL, TOTAL: 188 MG/DL
CLARITY, POC: CLEAR
CO2: 23 MMOL/L (ref 22–29)
COLOR, POC: YELLOW
CREAT SERPL-MCNC: 1 MG/DL (ref 0.7–1.2)
EOSINOPHILS ABSOLUTE: 0.33 K/UL (ref 0.05–0.5)
EOSINOPHILS RELATIVE PERCENT: 4 % (ref 0–6)
GFR, ESTIMATED: 89 ML/MIN/1.73M2
GLUCOSE BLD-MCNC: 98 MG/DL (ref 74–99)
GLUCOSE URINE, POC: NEGATIVE MG/DL
HCT VFR BLD CALC: 41.9 % (ref 37–54)
HDLC SERPL-MCNC: 37 MG/DL
HEMOGLOBIN: 13.9 G/DL (ref 12.5–16.5)
IMMATURE GRANULOCYTES %: 0 % (ref 0–5)
IMMATURE GRANULOCYTES ABSOLUTE: 0.04 K/UL (ref 0–0.58)
KETONES, POC: NEGATIVE MG/DL
LDL CHOLESTEROL: 88 MG/DL
LEUKOCYTE EST, POC: NEGATIVE
LYMPHOCYTES ABSOLUTE: 2.54 K/UL (ref 1.5–4)
LYMPHOCYTES RELATIVE PERCENT: 27 % (ref 20–42)
MCH RBC QN AUTO: 32.3 PG (ref 26–35)
MCHC RBC AUTO-ENTMCNC: 33.2 G/DL (ref 32–34.5)
MCV RBC AUTO: 97.4 FL (ref 80–99.9)
MONOCYTES ABSOLUTE: 0.97 K/UL (ref 0.1–0.95)
MONOCYTES RELATIVE PERCENT: 10 % (ref 2–12)
NEUTROPHILS ABSOLUTE: 5.34 K/UL (ref 1.8–7.3)
NEUTROPHILS RELATIVE PERCENT: 58 % (ref 43–80)
NITRITE, POC: NEGATIVE
PDW BLD-RTO: 12.9 % (ref 11.5–15)
PH, POC: 6.5
PLATELET # BLD: 336 K/UL (ref 130–450)
PMV BLD AUTO: 10.3 FL (ref 7–12)
POTASSIUM SERPL-SCNC: 3.8 MMOL/L (ref 3.5–5)
PROTEIN, POC: NEGATIVE MG/DL
RBC # BLD: 4.3 M/UL (ref 3.8–5.8)
SODIUM BLD-SCNC: 140 MMOL/L (ref 132–146)
SPECIFIC GRAVITY, POC: >=1.03
TRIGL SERPL-MCNC: 317 MG/DL
UROBILINOGEN, POC: 0.2 MG/DL
VLDLC SERPL CALC-MCNC: 63 MG/DL
WBC # BLD: 9.3 K/UL (ref 4.5–11.5)

## 2024-11-04 RX ORDER — CIPROFLOXACIN 500 MG/1
500 TABLET, FILM COATED ORAL 2 TIMES DAILY
Qty: 14 TABLET | Refills: 0 | Status: SHIPPED | OUTPATIENT
Start: 2024-11-04 | End: 2024-11-11

## 2024-11-04 RX ORDER — LOSARTAN POTASSIUM AND HYDROCHLOROTHIAZIDE 12.5; 5 MG/1; MG/1
1 TABLET ORAL DAILY
Qty: 90 TABLET | Refills: 3 | Status: SHIPPED | OUTPATIENT
Start: 2024-11-04

## 2024-11-04 NOTE — PATIENT INSTRUCTIONS
Well Visit, Over 65: Care Instructions  Well visits can help you stay healthy. Your doctor has checked your overall health and may have suggested ways to take good care of yourself. Your doctor also may have recommended tests. You can help prevent illness with healthy eating, good sleep, vaccinations, regular exercise, and other steps.    Get the tests that you and your doctor decide on. Depending on your age and risks, examples might include hearing tests as well as screening for colon, breast, and lung cancer. Screening helps find diseases before any symptoms appear.   Eat healthy foods. Choose fruits, vegetables, whole grains, lean protein, and low-fat dairy foods. Limit saturated fat, and reduce salt.     Limit alcohol. Men should have no more than 2 drinks a day. Women should have no more than 1. For some people, no alcohol is the best choice.   Exercise. It can help prevent falls. Get at least 30 minutes of exercise on most days of the week. Walking, yoga, and magnolia chi can be good choices.     Reach and stay at your healthy weight. This will lower your risk for many health problems.   Take care of your mental health. Try to stay connected with friends, family, and community, and find ways to manage stress.     If you're feeling depressed or hopeless, talk to someone. A counselor can help. If you don't have a counselor, talk to your doctor.   Talk to your doctor if you think you may have a problem with alcohol or drug use. This includes prescription medicines and illegal drugs.     Avoid tobacco and nicotine: Don't smoke, vape, or chew. If you need help quitting, talk to your doctor.   Practice safer sex. Getting tested, using condoms or dental dams, and limiting sex partners can help prevent STIs.     Make an advance directive. This is a legal way to tell your family and doctor what you want to happen at the end of your life or when you can't speak for yourself.   Prevent problems where you can. Protect

## 2024-11-04 NOTE — PROGRESS NOTES
Well Adult Note  Name: Danie Ireland Today’s Date: 2024   MRN: [id number] Sex: Male   Age: 64 y.o. Ethnicity: Non- / Non    : 1960 Race: White (non-)      Danie Ireland is here for a well adult exam.       Subjective   History:  Right flank pain 2 days ago. No gross hematuria. \"My prostate might be acting up.\" Sx only at night. Hx of kidney stone, feels similar.     Review of Systems   All other systems reviewed and are negative.      Allergies   Allergen Reactions    Codeine Other (See Comments)     irritability     Prior to Visit Medications    Medication Sig Taking? Authorizing Provider   losartan-hydroCHLOROthiazide (HYZAAR) 50-12.5 MG per tablet Take 1 tablet by mouth daily Yes Yu Díaz PA-C   ciprofloxacin (CIPRO) 500 MG tablet Take 1 tablet by mouth 2 times daily for 7 days Yes Yu Díaz PA-C   vitamin C (ASCORBIC ACID) 500 MG tablet Take 1 tablet by mouth daily Yes ProviderManny MD   zinc gluconate 50 MG tablet Take 1 tablet by mouth daily Yes Manny Badillo MD     Past Medical History:   Diagnosis Date    Anxiety     Arthritis     GERD (gastroesophageal reflux disease)     Herpes     Hypertension     Posterior auricular mass      Past Surgical History:   Procedure Laterality Date    COLONOSCOPY      few years ago    EAR SURGERY Right 2024    EXCISION RIGHT POST AURICULAR MASS performed by Serge Lopez MD at [facility] OR    FINGER SURGERY Left 25    left hand surgery - tendons    UPPER GASTROINTESTINAL ENDOSCOPY N/A 2022    EGD DILATION SAVORY performed by John Denny MD at [facility] ENDOSCOPY     Family History   Problem Relation Age of Onset    Cancer Mother 70        ovarian    Depression Sister     Substance Abuse Brother     No Known Problems Sister     No Known Problems Sister     No Known Problems Sister     No Known Problems Sister     No Known Problems Brother     Alcohol Abuse Son     Substance Abuse Son      Social History

## 2025-01-23 ENCOUNTER — OFFICE VISIT (OUTPATIENT)
Dept: PRIMARY CARE CLINIC | Age: 65
End: 2025-01-23
Payer: MEDICAID

## 2025-01-23 VITALS
SYSTOLIC BLOOD PRESSURE: 138 MMHG | BODY MASS INDEX: 34.62 KG/M2 | HEIGHT: 64 IN | WEIGHT: 202.8 LBS | OXYGEN SATURATION: 98 % | RESPIRATION RATE: 18 BRPM | HEART RATE: 52 BPM | TEMPERATURE: 97 F | DIASTOLIC BLOOD PRESSURE: 82 MMHG

## 2025-01-23 DIAGNOSIS — G62.9 NEUROPATHY: ICD-10-CM

## 2025-01-23 DIAGNOSIS — M48.061 LUMBAR FORAMINAL STENOSIS: ICD-10-CM

## 2025-01-23 DIAGNOSIS — S00.86XA TICK BITE OF OTHER PART OF HEAD, INITIAL ENCOUNTER: ICD-10-CM

## 2025-01-23 DIAGNOSIS — R73.01 IMPAIRED FASTING GLUCOSE: Primary | ICD-10-CM
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
## 2025-01-23 DIAGNOSIS — M48.062 SPINAL STENOSIS OF LUMBAR REGION WITH NEUROGENIC CLAUDICATION: ICD-10-CM

## 2025-01-23 DIAGNOSIS — M50.30 DDD (DEGENERATIVE DISC DISEASE), CERVICAL: ICD-10-CM

## 2025-01-23 LAB — HBA1C MFR BLD: 5.6 %

## 2025-01-23 PROCEDURE — 3075F SYST BP GE 130 - 139MM HG: CPT | Performed by: PHYSICIAN ASSISTANT

## 2025-01-23 PROCEDURE — G8427 DOCREV CUR MEDS BY ELIG CLIN: HCPCS | Performed by: PHYSICIAN ASSISTANT

## 2025-01-23 PROCEDURE — 99214 OFFICE O/P EST MOD 30 MIN: CPT | Performed by: PHYSICIAN ASSISTANT

## 2025-01-23 PROCEDURE — 1123F ACP DISCUSS/DSCN MKR DOCD: CPT | Performed by: PHYSICIAN ASSISTANT

## 2025-01-23 PROCEDURE — 1036F TOBACCO NON-USER: CPT | Performed by: PHYSICIAN ASSISTANT

## 2025-01-23 PROCEDURE — 3017F COLORECTAL CA SCREEN DOC REV: CPT | Performed by: PHYSICIAN ASSISTANT

## 2025-01-23 PROCEDURE — 3079F DIAST BP 80-89 MM HG: CPT | Performed by: PHYSICIAN ASSISTANT

## 2025-01-23 PROCEDURE — 83036 HEMOGLOBIN GLYCOSYLATED A1C: CPT | Performed by: PHYSICIAN ASSISTANT

## 2025-01-23 PROCEDURE — G8417 CALC BMI ABV UP PARAM F/U: HCPCS | Performed by: PHYSICIAN ASSISTANT

## 2025-01-23 SDOH — ECONOMIC STABILITY: FOOD INSECURITY: WITHIN THE PAST 12 MONTHS, YOU WORRIED THAT YOUR FOOD WOULD RUN OUT BEFORE YOU GOT MONEY TO BUY MORE.: NEVER TRUE

## 2025-01-23 SDOH — ECONOMIC STABILITY: FOOD INSECURITY: WITHIN THE PAST 12 MONTHS, THE FOOD YOU BOUGHT JUST DIDN'T LAST AND YOU DIDN'T HAVE MONEY TO GET MORE.: NEVER TRUE

## 2025-01-23 ASSESSMENT — PATIENT HEALTH QUESTIONNAIRE - PHQ9
SUM OF ALL RESPONSES TO PHQ QUESTIONS 1-9: 0
SUM OF ALL RESPONSES TO PHQ QUESTIONS 1-9: 0
2. FEELING DOWN, DEPRESSED OR HOPELESS: NOT AT ALL
SUM OF ALL RESPONSES TO PHQ QUESTIONS 1-9: 0
1. LITTLE INTEREST OR PLEASURE IN DOING THINGS: NOT AT ALL
SUM OF ALL RESPONSES TO PHQ9 QUESTIONS 1 & 2: 0
SUM OF ALL RESPONSES TO PHQ QUESTIONS 1-9: 0

## 2025-01-23 NOTE — PROGRESS NOTES
Chief Complaint   Patient presents with    Flu Vaccine     Pt refused     Hypertension    Numbness     Certain spots all over his body he thinks he may have neuropathy, but he also states he was bit by a tick last summer and just pulled it off with no further medical attention pt thinks he may have lyme disease as well        HPI:  Patient is here for follow-up of as above..    Sensitive to the sun on his head. Tingling in the left arm. Tingling in the feet. He didn't do c spine xray I ordered in Aug, bc \"I felt better.\" Sx are \"fluctuating.\"   He had a tick embedded for unknown length of time in the summer. He was able to pull it off. No bullseye rashes.      Patient's past medical, surgical, social and/or family history reviewed, updated in chart, and are non-contributory (unless otherwise stated).  Medications and allergies also reviewed and updated in chart.    Review of Systems:  Constitutional:  No fever, no fatigue, no chills, no headaches, no weight change  Dermatology:  No rash, no mole, no dry or sensitive skin  ENT:  No cough, no sore throat, no sinus pain, no runny nose, no ear pain  Cardiology:  No chest pain, no palpitations, no leg edema, no shortness of breath, no PND  Gastroenterology:  No dysphagia, no abdominal pain, no nausea, no vomiting, no constipation, no diarrhea, no heartburn  Musculoskeletal:  + joint pain, no leg cramps, + back pain, no muscle aches  Respiratory:  No shortness of breath, no orthopnea, no wheezing, no TOMLINSON, no hemoptysis  Urology:  No blood in the urine, no urinary frequency, no urinary incontinence, no urinary urgency, no nocturia, no dysuria    Vitals:    01/23/25 1016   BP: 138/82   Pulse: 52   Resp: 18   Temp: 97 °F (36.1 °C)   SpO2: 98%   Weight: 92 kg (202 lb 12.8 oz)   Height: 1.626 m (5' 4.02\")       Physical Exam  Constitutional:       General: He is not in acute distress.     Appearance: Normal appearance. He is well-developed.   HENT:      Head:

## 2025-01-29 LAB — BORRELIA BURGDORFERI ABS TOTAL: 0.24 IV

## 2025-02-12 ENCOUNTER — APPOINTMENT (OUTPATIENT)
Dept: GENERAL RADIOLOGY | Age: 65
End: 2025-02-12
Payer: MEDICAID

## 2025-02-12 ENCOUNTER — HOSPITAL ENCOUNTER (EMERGENCY)
Age: 65
Discharge: HOME | End: 2025-02-12
Attending: EMERGENCY MEDICINE
Payer: MEDICAID

## 2025-02-12 VITALS
HEIGHT: 66 IN | WEIGHT: 218 LBS | BODY MASS INDEX: 35.03 KG/M2 | SYSTOLIC BLOOD PRESSURE: 170 MMHG | OXYGEN SATURATION: 99 % | TEMPERATURE: 98.2 F | RESPIRATION RATE: 16 BRPM | DIASTOLIC BLOOD PRESSURE: 69 MMHG | HEART RATE: 55 BPM

## 2025-02-12 DIAGNOSIS — R11.0 NAUSEA: Primary | ICD-10-CM

## 2025-02-12 LAB
ALBUMIN SERPL-MCNC: 4.3 G/DL (ref 3.5–5.2)
ALP SERPL-CCNC: 76 U/L (ref 40–129)
ALT SERPL-CCNC: 32 U/L (ref 0–40)
ANION GAP SERPL CALCULATED.3IONS-SCNC: 10 MMOL/L (ref 7–16)
AST SERPL-CCNC: 23 U/L (ref 0–39)
BASOPHILS # BLD: 0.03 K/UL (ref 0–0.2)
BASOPHILS NFR BLD: 1 % (ref 0–2)
BILIRUB SERPL-MCNC: 0.8 MG/DL (ref 0–1.2)
BILIRUB UR QL STRIP: NEGATIVE
BUN SERPL-MCNC: 14 MG/DL (ref 6–23)
CALCIUM SERPL-MCNC: 9.3 MG/DL (ref 8.6–10.2)
CHLORIDE SERPL-SCNC: 99 MMOL/L (ref 98–107)
CLARITY UR: CLEAR
CO2 SERPL-SCNC: 26 MMOL/L (ref 22–29)
COLOR UR: YELLOW
CREAT SERPL-MCNC: 0.8 MG/DL (ref 0.7–1.2)
EOSINOPHIL # BLD: 0.12 K/UL (ref 0.05–0.5)
EOSINOPHILS RELATIVE PERCENT: 2 % (ref 0–6)
ERYTHROCYTE [DISTWIDTH] IN BLOOD BY AUTOMATED COUNT: 12.7 % (ref 11.5–15)
GFR, ESTIMATED: >90 ML/MIN/1.73M2
GLUCOSE SERPL-MCNC: 103 MG/DL (ref 74–99)
GLUCOSE UR STRIP-MCNC: NEGATIVE MG/DL
HCT VFR BLD AUTO: 42.9 % (ref 37–54)
HGB BLD-MCNC: 14.3 G/DL (ref 12.5–16.5)
HGB UR QL STRIP.AUTO: NEGATIVE
IMM GRANULOCYTES # BLD AUTO: <0.03 K/UL (ref 0–0.58)
IMM GRANULOCYTES NFR BLD: 0 % (ref 0–5)
KETONES UR STRIP-MCNC: NEGATIVE MG/DL
LACTATE BLDV-SCNC: 1 MMOL/L (ref 0.5–2.2)
LEUKOCYTE ESTERASE UR QL STRIP: NEGATIVE
LIPASE SERPL-CCNC: 26 U/L (ref 13–60)
LYMPHOCYTES NFR BLD: 2.24 K/UL (ref 1.5–4)
LYMPHOCYTES RELATIVE PERCENT: 35 % (ref 20–42)
MCH RBC QN AUTO: 32.4 PG (ref 26–35)
MCHC RBC AUTO-ENTMCNC: 33.3 G/DL (ref 32–34.5)
MCV RBC AUTO: 97.1 FL (ref 80–99.9)
MONOCYTES NFR BLD: 0.56 K/UL (ref 0.1–0.95)
MONOCYTES NFR BLD: 9 % (ref 2–12)
NEUTROPHILS NFR BLD: 53 % (ref 43–80)
NEUTS SEG NFR BLD: 3.35 K/UL (ref 1.8–7.3)
NITRITE UR QL STRIP: NEGATIVE
PH UR STRIP: 7.5 [PH] (ref 5–8)
PLATELET # BLD AUTO: 265 K/UL (ref 130–450)
PMV BLD AUTO: 9.8 FL (ref 7–12)
POTASSIUM SERPL-SCNC: 4 MMOL/L (ref 3.5–5)
PROT SERPL-MCNC: 7.8 G/DL (ref 6.4–8.3)
PROT UR STRIP-MCNC: NEGATIVE MG/DL
RBC # BLD AUTO: 4.42 M/UL (ref 3.8–5.8)
RBC #/AREA URNS HPF: NORMAL /HPF
SODIUM SERPL-SCNC: 135 MMOL/L (ref 132–146)
SP GR UR STRIP: 1.01 (ref 1–1.03)
TROPONIN I SERPL HS-MCNC: <6 NG/L (ref 0–11)
UROBILINOGEN UR STRIP-ACNC: 0.2 EU/DL (ref 0–1)
WBC #/AREA URNS HPF: NORMAL /HPF
WBC OTHER # BLD: 6.3 K/UL (ref 4.5–11.5)

## 2025-02-12 PROCEDURE — 85025 COMPLETE CBC W/AUTO DIFF WBC: CPT

## 2025-02-12 PROCEDURE — 6370000000 HC RX 637 (ALT 250 FOR IP)

## 2025-02-12 PROCEDURE — 96374 THER/PROPH/DIAG INJ IV PUSH: CPT

## 2025-02-12 PROCEDURE — 80053 COMPREHEN METABOLIC PANEL: CPT

## 2025-02-12 PROCEDURE — 81001 URINALYSIS AUTO W/SCOPE: CPT

## 2025-02-12 PROCEDURE — 99285 EMERGENCY DEPT VISIT HI MDM: CPT

## 2025-02-12 PROCEDURE — 71046 X-RAY EXAM CHEST 2 VIEWS: CPT

## 2025-02-12 PROCEDURE — 6360000002 HC RX W HCPCS

## 2025-02-12 PROCEDURE — 84484 ASSAY OF TROPONIN QUANT: CPT

## 2025-02-12 PROCEDURE — 83605 ASSAY OF LACTIC ACID: CPT

## 2025-02-12 PROCEDURE — 93005 ELECTROCARDIOGRAM TRACING: CPT

## 2025-02-12 PROCEDURE — 83690 ASSAY OF LIPASE: CPT

## 2025-02-12 RX ORDER — PANTOPRAZOLE SODIUM 40 MG/1
40 TABLET, DELAYED RELEASE ORAL
Qty: 30 TABLET | Refills: 0 | Status: SHIPPED | OUTPATIENT
Start: 2025-02-12

## 2025-02-12 RX ORDER — ONDANSETRON 2 MG/ML
4 INJECTION INTRAMUSCULAR; INTRAVENOUS ONCE
Status: COMPLETED | OUTPATIENT
Start: 2025-02-12 | End: 2025-02-12

## 2025-02-12 RX ORDER — ONDANSETRON 4 MG/1
4 TABLET, FILM COATED ORAL 3 TIMES DAILY PRN
Qty: 30 TABLET | Refills: 0 | Status: SHIPPED | OUTPATIENT
Start: 2025-02-12

## 2025-02-12 RX ADMIN — LIDOCAINE HYDROCHLORIDE: 20 SOLUTION ORAL at 11:02

## 2025-02-12 RX ADMIN — ONDANSETRON 4 MG: 2 INJECTION, SOLUTION INTRAMUSCULAR; INTRAVENOUS at 11:02

## 2025-02-12 ASSESSMENT — PAIN - FUNCTIONAL ASSESSMENT: PAIN_FUNCTIONAL_ASSESSMENT: 0-10

## 2025-02-12 ASSESSMENT — LIFESTYLE VARIABLES
HOW MANY STANDARD DRINKS CONTAINING ALCOHOL DO YOU HAVE ON A TYPICAL DAY: PATIENT DOES NOT DRINK
HOW OFTEN DO YOU HAVE A DRINK CONTAINING ALCOHOL: NEVER

## 2025-02-12 ASSESSMENT — PAIN SCALES - GENERAL: PAINLEVEL_OUTOF10: 0

## 2025-02-12 NOTE — ED PROVIDER NOTES
Interpretation  Interpreted by emergency department physician, Radha Olsen MD      In the ED course        RADIOLOGY:   Non-plain film images such as CT, Ultrasound and MRI are read by the radiologist. Plain radiographic images are visualized and preliminarily interpreted by the ED Provider with the below findings:    Chest x-ray shows no obvious pneumothorax or acute fracture    Interpretation per the Radiologist below, if available at the time of this note:    XR CHEST (2 VW)   Final Result   No acute process.      No significant interval change.           No results found.    No results found.    PROCEDURES   Unless otherwise noted below, none          CRITICAL CARE TIME (.cct)   N/A    PAST MEDICAL HISTORY/Chronic Conditions Affecting Care      has a past medical history of Anxiety, Arthritis, GERD (gastroesophageal reflux disease), Herpes, Hypertension, and Posterior auricular mass.     EMERGENCY DEPARTMENT COURSE    Vitals:    Vitals:    02/12/25 1024   BP: (!) 170/69   Pulse: 55   Resp: 16   Temp: 98.2 °F (36.8 °C)   TempSrc: Oral   SpO2: 99%   Weight: 98.9 kg (218 lb)   Height: 1.676 m (5' 6\")       Patient was given the following medications:  Medications   ondansetron (ZOFRAN) injection 4 mg (4 mg IntraVENous Given 2/12/25 1102)   aluminum & magnesium hydroxide-simethicone (MAALOX) 30 mL, lidocaine viscous hcl (XYLOCAINE) 5 mL (GI COCKTAIL) ( Oral Given 2/12/25 1102)           Is this patient to be included in the SEP-1 core measure? No   Exclusion criteria - the patient is NOT to be included for SEP-1 Core Measure due to:  Infection is not suspected        Medical Decision Making/Differential Diagnosis:    CC/HPI Summary, Social Determinants of health, Records Reviewed, DDx, testing done/not done, ED Course, Reassessment, disposition considerations/shared decision making with patient, consults, disposition:      ED Course as of 02/12/25 1429   Wed Feb 12, 2025   1142 EKG:  This EKG is signed and

## 2025-02-12 NOTE — DISCHARGE INSTRUCTIONS
Follow-up with your primary care physician and take any medications as prescribed.  Use the Zofran as needed for nausea.  We also recommend that you follow-up with the attached gastroenterologist for further evaluation.

## 2025-02-13 LAB
EKG ATRIAL RATE: 50 BPM
EKG P AXIS: 38 DEGREES
EKG P-R INTERVAL: 168 MS
EKG Q-T INTERVAL: 418 MS
EKG QRS DURATION: 96 MS
EKG QTC CALCULATION (BAZETT): 381 MS
EKG R AXIS: -3 DEGREES
EKG T AXIS: 13 DEGREES
EKG VENTRICULAR RATE: 50 BPM

## 2025-02-13 PROCEDURE — 93010 ELECTROCARDIOGRAM REPORT: CPT | Performed by: INTERNAL MEDICINE

## 2025-03-04 DIAGNOSIS — I10 ESSENTIAL HYPERTENSION: ICD-10-CM

## 2025-03-04 RX ORDER — LOSARTAN POTASSIUM AND HYDROCHLOROTHIAZIDE 12.5; 5 MG/1; MG/1
1 TABLET ORAL DAILY
Qty: 90 TABLET | Refills: 3 | Status: SHIPPED | OUTPATIENT
Start: 2025-03-04

## 2025-03-04 NOTE — TELEPHONE ENCOUNTER
Name of Medication(s) Requested:  Requested Prescriptions     Pending Prescriptions Disp Refills    losartan-hydroCHLOROthiazide (HYZAAR) 50-12.5 MG per tablet 90 tablet 3     Sig: Take 1 tablet by mouth daily       Medication is on current medication list Yes    Dosage and directions were verified? Yes    Quantity verified: 90 day supply     Pharmacy Verified?  Yes    Last Appointment:  1/23/2025    Future appts:  Future Appointments   Date Time Provider Department Center   3/28/2025  3:00 PM Jovita Parada MD [facility]   5/23/2025 10:30 AM Yu Díaz PA-C TRAIL [facility] ECC DEP        (If no appt send self scheduling link. .REFILLAPPT)  Scheduling request sent?     [] Yes  [] No    Does patient need updated?  [] Yes  [] No

## 2025-03-28 ENCOUNTER — OFFICE VISIT (OUTPATIENT)
Dept: PHYSICAL MEDICINE AND REHAB | Age: 65
End: 2025-03-28
Payer: MEDICAID

## 2025-03-28 VITALS
SYSTOLIC BLOOD PRESSURE: 152 MMHG | WEIGHT: 210.32 LBS | HEIGHT: 66 IN | BODY MASS INDEX: 33.8 KG/M2 | DIASTOLIC BLOOD PRESSURE: 67 MMHG | HEART RATE: 58 BPM

## 2025-03-28 DIAGNOSIS — M51.362 DEGENERATION OF INTERVERTEBRAL DISC OF LUMBAR REGION WITH DISCOGENIC BACK PAIN AND LOWER EXTREMITY PAIN: ICD-10-CM

## 2025-03-28 DIAGNOSIS — M54.42 CHRONIC BILATERAL LOW BACK PAIN WITH BILATERAL SCIATICA: ICD-10-CM

## 2025-03-28 DIAGNOSIS — M54.41 CHRONIC BILATERAL LOW BACK PAIN WITH BILATERAL SCIATICA: ICD-10-CM

## 2025-03-28 DIAGNOSIS — G89.29 CHRONIC BILATERAL LOW BACK PAIN WITH BILATERAL SCIATICA: ICD-10-CM

## 2025-03-28 DIAGNOSIS — M79.2 NEUROPATHIC PAIN: Primary | ICD-10-CM

## 2025-03-28 DIAGNOSIS — M54.17 LUMBOSACRAL RADICULITIS: ICD-10-CM

## 2025-03-28 PROCEDURE — 3017F COLORECTAL CA SCREEN DOC REV: CPT | Performed by: STUDENT IN AN ORGANIZED HEALTH CARE EDUCATION/TRAINING PROGRAM

## 2025-03-28 PROCEDURE — 1036F TOBACCO NON-USER: CPT | Performed by: STUDENT IN AN ORGANIZED HEALTH CARE EDUCATION/TRAINING PROGRAM

## 2025-03-28 PROCEDURE — 3078F DIAST BP <80 MM HG: CPT | Performed by: STUDENT IN AN ORGANIZED HEALTH CARE EDUCATION/TRAINING PROGRAM

## 2025-03-28 PROCEDURE — 1124F ACP DISCUSS-NO DSCNMKR DOCD: CPT | Performed by: STUDENT IN AN ORGANIZED HEALTH CARE EDUCATION/TRAINING PROGRAM

## 2025-03-28 PROCEDURE — G8427 DOCREV CUR MEDS BY ELIG CLIN: HCPCS | Performed by: STUDENT IN AN ORGANIZED HEALTH CARE EDUCATION/TRAINING PROGRAM

## 2025-03-28 PROCEDURE — 99204 OFFICE O/P NEW MOD 45 MIN: CPT | Performed by: STUDENT IN AN ORGANIZED HEALTH CARE EDUCATION/TRAINING PROGRAM

## 2025-03-28 PROCEDURE — G8417 CALC BMI ABV UP PARAM F/U: HCPCS | Performed by: STUDENT IN AN ORGANIZED HEALTH CARE EDUCATION/TRAINING PROGRAM

## 2025-03-28 PROCEDURE — 3077F SYST BP >= 140 MM HG: CPT | Performed by: STUDENT IN AN ORGANIZED HEALTH CARE EDUCATION/TRAINING PROGRAM

## 2025-03-28 NOTE — PROGRESS NOTES
times daily as needed for Nausea or Vomiting, Disp: 30 tablet, Rfl: 0    pantoprazole (PROTONIX) 40 MG tablet, Take 1 tablet by mouth every morning (before breakfast), Disp: 30 tablet, Rfl: 0    vitamin C (ASCORBIC ACID) 500 MG tablet, Take 1 tablet by mouth daily, Disp: , Rfl:     zinc gluconate 50 MG tablet, Take 1 tablet by mouth daily, Disp: , Rfl:      Allergies:  Codeine     PHYSICAL EXAMINATION  BP (!) 152/67 (BP Site: Left Upper Arm, Patient Position: Sitting, BP Cuff Size: Large Adult)   Pulse 58   Ht 1.676 m (5' 6\")   Wt 95.4 kg (210 lb 5.1 oz)   BMI 33.95 kg/m²   Body mass index is 33.95 kg/m².  Physical Exam  Bilateral patella and Achilles reflexes are 2+ and equal. Negative seated straight leg raise. Strength is 5 out of 5 in bilateral lower extremities. Normal sensation to light touch in all dermatomes of the lower extremities except for medial. There is some decreased sensation to pinprick differentiation between dull and pinprick in nondermatomal pattern bilaterally. There is decreased vibration sense from the foot to the knee. Negative straight leg raise while lying down.     GENERAL: NAD, good eye contact, well appearing, responds appropriately.  HEENT: Atraumatic, normocephalic. EOMI grossly intact to tracking examiner, sclerae anicteric.  THORACIC/PULM: No visible chest wall deformities. No tachypnea, no accessory muscle use at rest.  CARDIO & VASC: Regular rate and rhythm to peripheral pulse.  Upper and lower limbs are warm, well perfused. No cyanosis, or edema.  SKIN: Intact. No rashes, bruises, or ulcers over visible skin  PSYCH: Affect appears normal, mood congruent.  MUSCULOSKELETAL  There was no leg length discrepancy.  Hamstrings: mild tightness noted bilaterally.   Piriformis: + tightness noted b/l.     Ankle clonus  LEFT RIGHT   No No     Babinski  LEFT RIGHT   - -       Upper Limb Strength - antigravity strength observed in clinic    Lower Limb Strength    LEFT RIGHT   Hip Flexion

## 2025-05-23 ENCOUNTER — OFFICE VISIT (OUTPATIENT)
Dept: PRIMARY CARE CLINIC | Age: 65
End: 2025-05-23
Payer: MEDICAID

## 2025-05-23 VITALS
HEART RATE: 60 BPM | BODY MASS INDEX: 33.59 KG/M2 | OXYGEN SATURATION: 96 % | TEMPERATURE: 97.7 F | WEIGHT: 209 LBS | HEIGHT: 66 IN | SYSTOLIC BLOOD PRESSURE: 138 MMHG | RESPIRATION RATE: 16 BRPM | DIASTOLIC BLOOD PRESSURE: 70 MMHG

## 2025-05-23 DIAGNOSIS — I10 ESSENTIAL HYPERTENSION: Primary | ICD-10-CM

## 2025-05-23 DIAGNOSIS — E78.1 HYPERTRIGLYCERIDEMIA: ICD-10-CM

## 2025-05-23 DIAGNOSIS — G62.9 NEUROPATHY: ICD-10-CM

## 2025-05-23 PROCEDURE — G8417 CALC BMI ABV UP PARAM F/U: HCPCS | Performed by: PHYSICIAN ASSISTANT

## 2025-05-23 PROCEDURE — G8427 DOCREV CUR MEDS BY ELIG CLIN: HCPCS | Performed by: PHYSICIAN ASSISTANT

## 2025-05-23 PROCEDURE — 3075F SYST BP GE 130 - 139MM HG: CPT | Performed by: PHYSICIAN ASSISTANT

## 2025-05-23 PROCEDURE — 1124F ACP DISCUSS-NO DSCNMKR DOCD: CPT | Performed by: PHYSICIAN ASSISTANT

## 2025-05-23 PROCEDURE — 1036F TOBACCO NON-USER: CPT | Performed by: PHYSICIAN ASSISTANT

## 2025-05-23 PROCEDURE — 99214 OFFICE O/P EST MOD 30 MIN: CPT | Performed by: PHYSICIAN ASSISTANT

## 2025-05-23 PROCEDURE — 3078F DIAST BP <80 MM HG: CPT | Performed by: PHYSICIAN ASSISTANT

## 2025-05-23 PROCEDURE — 3017F COLORECTAL CA SCREEN DOC REV: CPT | Performed by: PHYSICIAN ASSISTANT

## 2025-05-23 RX ORDER — LOSARTAN POTASSIUM AND HYDROCHLOROTHIAZIDE 12.5; 5 MG/1; MG/1
1 TABLET ORAL DAILY
Qty: 90 TABLET | Refills: 3 | Status: SHIPPED | OUTPATIENT
Start: 2025-05-23

## 2025-05-23 NOTE — PROGRESS NOTES
General: No scleral icterus.     Conjunctiva/sclera: Conjunctivae normal.      Pupils: Pupils are equal, round, and reactive to light.   Neck:      Thyroid: No thyromegaly.   Cardiovascular:      Rate and Rhythm: Normal rate and regular rhythm.      Heart sounds: Normal heart sounds. No murmur heard.  Pulmonary:      Effort: Pulmonary effort is normal. No accessory muscle usage or respiratory distress.      Breath sounds: Normal breath sounds. No wheezing.   Abdominal:      General: Bowel sounds are normal. There is no distension.      Palpations: Abdomen is soft. There is no hepatomegaly or splenomegaly.      Tenderness: There is no abdominal tenderness.   Musculoskeletal:         General: Normal range of motion.      Cervical back: Normal range of motion and neck supple.      Right lower leg: No edema.      Left lower leg: No edema.   Skin:     General: Skin is warm and dry.      Findings: No rash.   Neurological:      Mental Status: He is alert and oriented to person, place, and time.      Deep Tendon Reflexes: Reflexes are normal and symmetric.   Psychiatric:         Mood and Affect: Mood and affect normal.         Speech: Speech normal.         Behavior: Behavior normal.         Assessment/Plan:      Danie was seen today for hypertension.    Diagnoses and all orders for this visit:    Essential hypertension  -     losartan-hydroCHLOROthiazide (HYZAAR) 50-12.5 MG per tablet; Take 1 tablet by mouth daily  -stable on current meds    Hypertriglyceridemia  Advised to reduce saturated fat intake to less than 7% of total calories for the day. Eliminate trans fats completely from diet. Increase dietary fiber intake.  To raise (HDL) advised to increased physical activity.   I recommended a minimum of 150 minutes per WEEK of moderate intensity exercise  We will recheck fasting lipid panel in 3-6 months. Labs should be fasting for 12 hrs with noting to eat or drink but medications with water.

## (undated) DEVICE — DOUBLE BASIN SET: Brand: MEDLINE INDUSTRIES, INC.

## (undated) DEVICE — DRAPE,LAPAROTOMY,PCH,STERILE: Brand: MEDLINE

## (undated) DEVICE — SKIN PREP TRAY 4 COMPARTM TRAY: Brand: MEDLINE INDUSTRIES, INC.

## (undated) DEVICE — GOWN,SIRUS,FABRNF,L,20/CS: Brand: MEDLINE

## (undated) DEVICE — BLADE CLIPPER GEN PURP NS

## (undated) DEVICE — NEEDLE HYPO 25GA L1.5IN BLU POLYPR HUB S STL REG BVL STR

## (undated) DEVICE — SOLUTION IRRIG 1000ML 0.9% SOD CHL USP POUR PLAS BTL

## (undated) DEVICE — GRADUATE TRIANG MEASURE 1000ML BLK PRNT

## (undated) DEVICE — ELECTRODE PT RET AD L9FT HI MOIST COND ADH HYDRGEL CORDED

## (undated) DEVICE — SNARE ENDOSCP AD L240CM LOOP W10MM SHTH DIA2.4MM RND INSUL

## (undated) DEVICE — SPONGE GZ W4XL4IN RAYON POLY FILL CVR W/ NONWOVEN FAB

## (undated) DEVICE — TOWEL,OR,DSP,ST,BLUE,STD,6/PK,12PK/CS: Brand: MEDLINE

## (undated) DEVICE — TUBING SUCT 12FR MAL ALUM SHFT FN CAP VENT UNIV CONN W/ OBT

## (undated) DEVICE — 4-PORT MANIFOLD: Brand: NEPTUNE 2

## (undated) DEVICE — BLADE,STAINLESS-STEEL,15,STRL,DISPOSABLE: Brand: MEDLINE

## (undated) DEVICE — SYRINGE,CONTROL,LL,FINGER,GRIP: Brand: MEDLINE INDUSTRIES, INC.

## (undated) DEVICE — BLOCK BITE 60FR RUBBER ADLT DENTAL

## (undated) DEVICE — PACK PROCEDURE SURG GEN CUST